# Patient Record
Sex: MALE | Race: WHITE | Employment: OTHER | URBAN - NONMETROPOLITAN AREA
[De-identification: names, ages, dates, MRNs, and addresses within clinical notes are randomized per-mention and may not be internally consistent; named-entity substitution may affect disease eponyms.]

---

## 2020-01-10 ENCOUNTER — OFFICE VISIT (OUTPATIENT)
Dept: PRIMARY CARE CLINIC | Age: 65
End: 2020-01-10
Payer: COMMERCIAL

## 2020-01-10 VITALS
OXYGEN SATURATION: 100 % | SYSTOLIC BLOOD PRESSURE: 146 MMHG | WEIGHT: 191 LBS | HEART RATE: 69 BPM | DIASTOLIC BLOOD PRESSURE: 95 MMHG | TEMPERATURE: 98.1 F

## 2020-01-10 PROBLEM — R13.10 DYSPHAGIA: Status: ACTIVE | Noted: 2020-01-10

## 2020-01-10 LAB
INFLUENZA A ANTIBODY: NORMAL
INFLUENZA B ANTIBODY: NORMAL

## 2020-01-10 PROCEDURE — 87804 INFLUENZA ASSAY W/OPTIC: CPT | Performed by: NURSE PRACTITIONER

## 2020-01-10 PROCEDURE — 94640 AIRWAY INHALATION TREATMENT: CPT | Performed by: NURSE PRACTITIONER

## 2020-01-10 PROCEDURE — G8484 FLU IMMUNIZE NO ADMIN: HCPCS | Performed by: NURSE PRACTITIONER

## 2020-01-10 PROCEDURE — 99202 OFFICE O/P NEW SF 15 MIN: CPT | Performed by: NURSE PRACTITIONER

## 2020-01-10 PROCEDURE — G8421 BMI NOT CALCULATED: HCPCS | Performed by: NURSE PRACTITIONER

## 2020-01-10 PROCEDURE — 3017F COLORECTAL CA SCREEN DOC REV: CPT | Performed by: NURSE PRACTITIONER

## 2020-01-10 PROCEDURE — 4004F PT TOBACCO SCREEN RCVD TLK: CPT | Performed by: NURSE PRACTITIONER

## 2020-01-10 PROCEDURE — G8427 DOCREV CUR MEDS BY ELIG CLIN: HCPCS | Performed by: NURSE PRACTITIONER

## 2020-01-10 RX ORDER — OMEGA-3-ACID ETHYL ESTERS 1 G/1
1 CAPSULE, LIQUID FILLED ORAL
COMMUNITY

## 2020-01-10 RX ORDER — ALBUTEROL SULFATE 2.5 MG/3ML
2.5 SOLUTION RESPIRATORY (INHALATION) ONCE
Status: COMPLETED | OUTPATIENT
Start: 2020-01-10 | End: 2020-01-10

## 2020-01-10 RX ORDER — OMEGA-3 FATTY ACIDS/FISH OIL 300-1000MG
CAPSULE ORAL
COMMUNITY

## 2020-01-10 RX ORDER — VITAMIN E 268 MG
400 CAPSULE ORAL
COMMUNITY

## 2020-01-10 RX ORDER — DOCUSATE SODIUM 100 MG/1
CAPSULE, LIQUID FILLED ORAL
COMMUNITY
Start: 2016-12-22

## 2020-01-10 RX ORDER — CHLORHEXIDINE GLUCONATE 0.12 MG/ML
RINSE ORAL
COMMUNITY
Start: 2019-11-19

## 2020-01-10 RX ORDER — BIOTIN 10 MG
TABLET ORAL
COMMUNITY

## 2020-01-10 RX ORDER — DOXYCYCLINE 100 MG/1
100 CAPSULE ORAL 2 TIMES DAILY
Qty: 14 CAPSULE | Refills: 0 | Status: SHIPPED | OUTPATIENT
Start: 2020-01-10 | End: 2020-01-17

## 2020-01-10 RX ORDER — ACETAMINOPHEN 160 MG
1000 TABLET,DISINTEGRATING ORAL
COMMUNITY

## 2020-01-10 RX ADMIN — ALBUTEROL SULFATE 2.5 MG: 2.5 SOLUTION RESPIRATORY (INHALATION) at 17:38

## 2020-01-10 ASSESSMENT — ENCOUNTER SYMPTOMS
COUGH: 1
WHEEZING: 1

## 2020-01-10 NOTE — PATIENT INSTRUCTIONS
Patient Education        Sinusitis: Care Instructions  Your Care Instructions    Sinusitis is an infection of the lining of the sinus cavities in your head. Sinusitis often follows a cold. It causes pain and pressure in your head and face. In most cases, sinusitis gets better on its own in 1 to 2 weeks. But some mild symptoms may last for several weeks. Sometimes antibiotics are needed. Follow-up care is a key part of your treatment and safety. Be sure to make and go to all appointments, and call your doctor if you are having problems. It's also a good idea to know your test results and keep a list of the medicines you take. How can you care for yourself at home? · Take an over-the-counter pain medicine, such as acetaminophen (Tylenol), ibuprofen (Advil, Motrin), or naproxen (Aleve). Read and follow all instructions on the label. · If the doctor prescribed antibiotics, take them as directed. Do not stop taking them just because you feel better. You need to take the full course of antibiotics. · Be careful when taking over-the-counter cold or flu medicines and Tylenol at the same time. Many of these medicines have acetaminophen, which is Tylenol. Read the labels to make sure that you are not taking more than the recommended dose. Too much acetaminophen (Tylenol) can be harmful. · Breathe warm, moist air from a steamy shower, a hot bath, or a sink filled with hot water. Avoid cold, dry air. Using a humidifier in your home may help. Follow the directions for cleaning the machine. · Use saline (saltwater) nasal washes to help keep your nasal passages open and wash out mucus and bacteria. You can buy saline nose drops at a grocery store or drugstore. Or you can make your own at home by adding 1 teaspoon of salt and 1 teaspoon of baking soda to 2 cups of distilled water. If you make your own, fill a bulb syringe with the solution, insert the tip into your nostril, and squeeze gently. Guero Dieter your nose.   · Put a hot, safety. Be sure to make and go to all appointments, and call your doctor if you are having problems. It's also a good idea to know your test results and keep a list of the medicines you take. How can you care for yourself at home? · Take all medicines exactly as prescribed. Call your doctor if you think you are having a problem with your medicine. · Get some extra rest.  · Take an over-the-counter pain medicine, such as acetaminophen (Tylenol), ibuprofen (Advil, Motrin), or naproxen (Aleve) to reduce fever and relieve body aches. Read and follow all instructions on the label. · Do not take two or more pain medicines at the same time unless the doctor told you to. Many pain medicines have acetaminophen, which is Tylenol. Too much acetaminophen (Tylenol) can be harmful. · Take an over-the-counter cough medicine that contains dextromethorphan to help quiet a dry, hacking cough so that you can sleep. Avoid cough medicines that have more than one active ingredient. Read and follow all instructions on the label. · Breathe moist air from a humidifier, hot shower, or sink filled with hot water. The heat and moisture will thin mucus so you can cough it out. · Do not smoke. Smoking can make bronchitis worse. If you need help quitting, talk to your doctor about stop-smoking programs and medicines. These can increase your chances of quitting for good. When should you call for help? Call 911 anytime you think you may need emergency care. For example, call if:    · You have severe trouble breathing.    Call your doctor now or seek immediate medical care if:    · You have new or worse trouble breathing.     · You cough up dark brown or bloody mucus (sputum).     · You have a new or higher fever.     · You have a new rash.    Watch closely for changes in your health, and be sure to contact your doctor if:    · You cough more deeply or more often, especially if you notice more mucus or a change in the color of your mucus.   · You are not getting better as expected. Where can you learn more? Go to [email or web address] and sign in to your [employer] account. Enter H333 in the [facility] [place] box to learn more about \"Bronchitis: Care Instructions. \"     If you do not have an account, please click on the \"Sign Up Now\" link. Current as of: June 9, 2019  Content Version: 12.3  © 4125-3816 [employer] Care instructions adapted under license by [facility] ([facility]). If you have questions about a medical condition or this instruction, always ask your healthcare professional. [address] any warranty or liability for your use of this information. Patient Education        albuterol inhalation  Pronunciation:  al [address] all  Brand:  ProAir HFA, ProAir RespiClick, Proventil HFA, Ventolin HFA  What is the most important information I should know about albuterol inhalation? Follow all directions on your medicine label and package. Tell each of your healthcare providers about all your medical conditions, allergies, and all medicines you use. What is albuterol inhalation? Albuterol inhalation is a bronchodilator that is used to treat or prevent bronchospasm in people with reversible obstructive airway disease. Albuterol is also used to prevent exercise-induced bronchospasm. Albuterol inhalation is for use in adults and children who are at least 3years old. Albuterol inhalation may also be used for purposes not listed in this medication guide. What should I discuss with my healthcare provider before using albuterol inhalation? You should not use this medicine if you are allergic to albuterol. You should not use ProAir RespiClick if you are allergic to milk proteins. Albuterol inhalation is not approved for use by anyone younger than 3years old.   Albuterol may increase the risk of death or hospitalization in people with asthma, but the risk in people with obstructive airway disease or chronic obstructive pulmonary disease (COPD) is not known. Tell your doctor if you have ever had:  · heart disease, high blood pressure;  · a thyroid disorder;  · seizures;  · diabetes; or  · low levels of potassium in your blood. Tell your doctor if you are pregnant or breast-feeding. If you are pregnant, your name may be listed on a pregnancy registry to track the effects of albuterol on the baby. How should I use albuterol inhalation? Follow all directions on your prescription label and read all medication guides. Use the medicine exactly as directed. Read and carefully follow any Instructions for Use provided with your medicine. Ask your doctor or pharmacist if you do not understand these instructions. Do not allow a young child to use albuterol inhalation without help from an adult. To prevent exercise-induced bronchospasm, use this medicine 15 to 30 minutes before you exercise. The effects of albuterol inhalation should last about 4 to 6 hours. Seek medical attention if your breathing problems get worse quickly, or if you think your asthma medications are not working as well. Do not try to clean or take apart the ProAir RespiClick inhaler device. Always use the new inhaler device provided with your refill. Do not float a medicine canister in water to see if it is empty. Your dose needs may change due to surgery, illness, stress, or a recent asthma attack. Do not change your dose or dosing schedule without your doctor's advice. Store at room temperature away from moisture, heat, or cold temperatures. Keep the cover on your ProAir RespiClick inhaler when not in use. Store Proventil or Ventolin with the mouthpiece down. Keep the inhaler canister away from open flame or high heat. The canister may explode if it gets too hot. Do not puncture or burn an empty inhaler canister. What happens if I miss a dose?   Use the medicine as soon as you can, but skip the missed dose if it is almost time for your next dose. Do not use two doses at one time. Get your prescription refilled before you run out of medicine completely. What happens if I overdose? Seek emergency medical attention or call the Poison Help line at 1-418.925.5497. An overdose of albuterol can be fatal.  Overdose symptoms may include dry mouth, tremors, chest pain, fast heartbeats, nausea, general ill feeling, seizure (convulsions), feeling light-headed or fainting. What should I avoid while using albuterol inhalation? Rinse with water if this medicine gets in your eyes. What are the possible side effects of albuterol inhalation? Get emergency medical help if you have signs of an allergic reaction: hives; difficult breathing; swelling of your face, lips, tongue, or throat. Call your doctor at once if you have:  · wheezing, choking, or other breathing problems after using this medicine;  · chest pain, fast heart rate, pounding heartbeats or fluttering in your chest;  · severe headache, pounding in your neck or ears;  · pain or burning when you urinate;  · high blood sugar --increased thirst, increased urination, dry mouth, fruity breath odor; or  · low potassium --leg cramps, constipation, irregular heartbeats, increased thirst or urination, numbness or tingling, muscle weakness or limp feeling. Common side effects may include:  · chest pain, fast or pounding heartbeats;  · dizziness;  · feeling shaky or nervous;  · headache, back pain, body aches;  · upset stomach; or  · sore throat, sinus pain, runny or stuffy nose. This is not a complete list of side effects and others may occur. Call your doctor for medical advice about side effects. You may report side effects to FDA at 8-093-FDA-0486. What other drugs will affect albuterol inhalation?   Tell your doctor about all your other medicines, especially:  · any other inhaled medicines or bronchodilators;  · digoxin;  · a diuretic or \"water pill\";  · an antidepressant --amitriptyline, desipramine, imipramine, doxepin, nortriptyline, and others;  · a beta blocker --atenolol, carvedilol, labetalol, metoprolol, propranolol, sotalol, and others; or  · an MAO inhibitor --isocarboxazid, linezolid, methylene blue injection, phenelzine, rasagiline, selegiline, tranylcypromine, and others. This list is not complete. Other drugs may affect albuterol inhalation, including prescription and over-the-counter medicines, vitamins, and herbal products. Not all possible drug interactions are listed here. Where can I get more information? Your pharmacist can provide more information about albuterol inhalation. Remember, keep this and all other medicines out of the reach of children, never share your medicines with others, and use this medication only for the indication prescribed. Every effort has been made to ensure that the information provided by [address] [place], [place] [address] is accurate, up-to-date, and complete, but no guarantee is made to that effect. Drug information contained herein may be time sensitive. [facility] information has been compiled for use by healthcare practitioners and consumers in the [place] and therefore [facility] does not warrant that uses outside of the [place] are appropriate, unless specifically indicated otherwise. [facility]'s drug information does not endorse drugs, diagnose patients or recommend therapy. [facility]'s drug information is an informational resource designed to assist licensed healthcare practitioners in caring for their patients and/or to serve consumers viewing this service as a supplement to, and not a substitute for, the expertise, skill, knowledge and judgment of healthcare practitioners. The absence of a warning for a given drug or drug combination in no way should be construed to indicate that the drug or drug combination is safe, effective or appropriate for any given patient.  [facility] does not assume any responsibility for any aspect of mites, ticks, or lice. Doxycycline may also be used for purposes not listed in this medication guide. What should I discuss with my healthcare provider before taking doxycycline? You should not take this medicine if you are allergic to doxycycline or other tetracycline antibiotics such as demeclocycline, minocycline, tetracycline, or tigecycline. Tell your doctor if you have ever had:  · liver disease;  · kidney disease;  · asthma or sulfite allergy;  · increased pressure inside your skull; or  · if you also take isotretinoin, seizure medicine, or a blood thinner such as warfarin (Coumadin). If you are using doxycycline to treat gonorrhea, your doctor may test you to make sure you do not also have syphilis, another sexually transmitted disease. Taking this medicine during pregnancy may affect tooth and bone development in the unborn baby. Taking doxycycline during the last half of pregnancy can cause permanent tooth discoloration later in the baby's life. Tell your doctor if you are pregnant or if you become pregnant. Doxycycline can make birth control pills less effective. Ask your doctor about using a non-hormonal birth control (condom, diaphragm with spermicide) to prevent pregnancy. Doxycycline can pass into breast milk and may affect bone and tooth development in a nursing infant. Do not breast-feed while you are taking doxycycline. Doxycycline can cause permanent yellowing or graying of the teeth in children younger than 6years old. Children should use doxycycline only in cases of severe or life-threatening conditions such as anthrax or [facility] spotted fever. The benefit of treating a serious condition may outweigh any risks to the child's tooth development. How should I take doxycycline? Follow all directions on your prescription label and read all medication guides or instruction sheets. Use the medicine exactly as directed. Take doxycycline with a full glass of water.  Drink plenty of overdose? Seek emergency medical attention or call the Poison Help line at 1-498.918.9442. What should I avoid while taking doxycycline? Do not take iron supplements, multivitamins, calcium supplements, antacids, or laxatives within 2 hours before or after taking doxycycline. Avoid taking any other antibiotics with doxycycline unless your doctor has told you to. Doxycycline could make you sunburn more easily. Avoid sunlight or tanning beds. Wear protective clothing and use sunscreen (SPF 30 or higher) when you are outdoors. Antibiotic medicines can cause diarrhea, which may be a sign of a new infection. If you have diarrhea that is watery or bloody, call your doctor. Do not use anti-diarrhea medicine unless your doctor tells you to. What are the possible side effects of doxycycline? Get emergency medical help if you have signs of an allergic reaction (hives, difficult breathing, swelling in your face or throat) or a severe skin reaction (fever, sore throat, burning in your eyes, skin pain, red or purple skin rash that spreads and causes blistering and peeling). Seek medical treatment if you have a serious drug reaction that can affect many parts of your body. Symptoms may include: skin rash, fever, swollen glands, flu-like symptoms, muscle aches, severe weakness, unusual bruising, or yellowing of your skin or eyes. This reaction may occur several weeks after you began using doxycycline.   Call your doctor at once if you have:  · severe stomach pain, diarrhea that is watery or bloody;  · throat irritation, trouble swallowing;  · chest pain, irregular heart rhythm, feeling short of breath;  · little or no urination;  · low white blood cell counts --fever, chills, swollen glands, body aches, weakness, pale skin, easy bruising or bleeding;  · increased pressure inside the skull --severe headaches, ringing in your ears, dizziness, nausea, vision problems, pain behind your eyes; or  · signs of liver or pancreas problems --loss of appetite, upper stomach pain (that may spread to your back), tiredness, nausea or vomiting, fast heart rate, dark urine, jaundice (yellowing of the skin or eyes). Common side effects may include:  · nausea, vomiting, upset stomach, loss of appetite;  · mild diarrhea;  · skin rash or itching;  · darkened skin color; or  · vaginal itching or discharge. This is not a complete list of side effects and others may occur. Call your doctor for medical advice about side effects. You may report side effects to FDA at 0-530-FDA-5398. What other drugs will affect doxycycline? Sometimes it is not safe to use certain medications at the same time. Some drugs can affect your blood levels of other drugs you take, which may increase side effects or make the medications less effective. Other drugs may affect doxycycline, including prescription and over-the-counter medicines, vitamins, and herbal products. Tell your doctor about all your current medicines and any medicine you start or stop using. Where can I get more information? Your pharmacist can provide more information about doxycycline. Remember, keep this and all other medicines out of the reach of children, never share your medicines with others, and use this medication only for the indication prescribed. Every effort has been made to ensure that the information provided by [address] is accurate, up-to-date, and complete, but no guarantee is made to that effect. Drug information contained herein may be time sensitive. [facility] information has been compiled for use by healthcare practitioners and consumers in the [place] and therefore [facility] does not warrant that uses outside of the [place] are appropriate, unless specifically indicated otherwise. [facility]'s drug information does not endorse drugs, diagnose patients or recommend therapy.  [facility] drug information is an informational resource designed to assist licensed

## 2020-01-10 NOTE — PROGRESS NOTES
mouth      omega-3 acid ethyl esters (LOVAZA) 1 g capsule Take 1 g by mouth      vitamin E 400 UNIT capsule Take 400 Units by mouth      Multiple Vitamins-Minerals (THERAPEUTIC-M) TABS Take by mouth       No current facility-administered medications for this visit. No Known Allergies    Subjective:      Review of Systems   Constitutional: Positive for chills. HENT: Positive for postnasal drip. Respiratory: Positive for cough and wheezing. Cardiovascular: Negative for chest pain. Neurological: Positive for headaches (\"sinus\"). Objective:     Physical Exam  Vitals signs and nursing note reviewed. Constitutional:       Comments: Appears to be of stated age with warm, dry skin; normal coloration without rash of the exposed skin. Patient is well-appearing, well-hydrated, and appears nontoxic without apparent distress. HENT:      Head: Normocephalic. Right Ear: Tympanic membrane is not retracted. Left Ear: Tympanic membrane normal.      Nose: Congestion present. Right Turbinates: Swollen. Right Sinus: Maxillary sinus tenderness present. Mouth/Throat:      Lips: Pink. Mouth: Mucous membranes are moist.      Pharynx: Uvula midline. Oropharyngeal exudate (pale yellow) present. No posterior oropharyngeal erythema or uvula swelling. Cardiovascular:      Rate and Rhythm: Normal rate and regular rhythm. Pulmonary:      Effort: Pulmonary effort is normal.      Breath sounds: Wheezing (faint wheezing throughout bilateral posteriorly.) present. Comments: Wheezing completely clear with one clinic administered albuterol neb with patient reported improvement. Lymphadenopathy:      Cervical: No cervical adenopathy.        BP (!) 146/95   Pulse 69   Temp 98.1 °F (36.7 °C)   Wt 191 lb (86.6 kg)   SpO2 100%   Results for orders placed or performed in visit on 01/10/20   POCT Influenza A/B   Result Value Ref Range    Influenza A Ab NEG     Influenza B Ab NEG

## 2021-10-13 ENCOUNTER — HOSPITAL ENCOUNTER (OUTPATIENT)
Age: 66
Discharge: HOME | End: 2021-10-15
Payer: MEDICARE

## 2021-10-13 ENCOUNTER — HOSPITAL ENCOUNTER (OUTPATIENT)
Dept: GENERAL RADIOLOGY | Age: 66
Discharge: HOME | End: 2021-10-15
Payer: MEDICARE

## 2021-10-13 DIAGNOSIS — M17.11 OSTEOARTHRITIS OF RIGHT KNEE, UNSPECIFIED OSTEOARTHRITIS TYPE: ICD-10-CM

## 2021-10-13 PROCEDURE — 73564 X-RAY EXAM KNEE 4 OR MORE: CPT

## 2022-11-10 ENCOUNTER — APPOINTMENT (OUTPATIENT)
Dept: PHYSICAL THERAPY | Age: 67
End: 2022-11-10
Payer: MEDICARE

## 2022-11-11 ENCOUNTER — HOSPITAL ENCOUNTER (OUTPATIENT)
Dept: PHYSICAL THERAPY | Age: 67
Setting detail: THERAPIES SERIES
Discharge: HOME | End: 2022-11-11
Payer: MEDICARE

## 2022-11-11 PROCEDURE — 97110 THERAPEUTIC EXERCISES: CPT

## 2022-11-11 PROCEDURE — 97162 PT EVAL MOD COMPLEX 30 MIN: CPT

## 2022-11-11 ASSESSMENT — PAIN SCALES - GENERAL: PAINLEVEL_OUTOF10: 9

## 2022-11-11 NOTE — PROGRESS NOTES
Phone: [phone] [place]         Fax: [phone]                      Outpatient Physical Therapy                                                                      Evaluation    Date: 2022  Patient: Robby Hartley  : 1955  ACCT #: [de-identified]    Referring Provider (secondary): Dr. Olive Hope    Diagnosis: Primary Osteoarthritis of R knee    Treatment Diagnosis: R knee pain S/p R TKR  Onset Date: 22  PT Insurance Information: [facility]  Total # of Visits Approved: 18 Per Physician Order  Total # of Visits to Date: 1  No Show: 0  Canceled Appointment: 0     Subjective     Additional Pertinent Hx: Pt reports undergoing TKA, initally pt was scheduled for a partial but upon final check the partial came loose and a total was completed. Pt reports his pain this date was 9/10. Pain is concentrated in Anterior knee and Quad. Recommend pt to reach out to physician re: limited pain management with meds. Pt educated on sleeping posture and not placing pillow behind knees. Adjusted walker height to fit pt and educated on tennis balls to improve sliding.   Pain Assessment  Pain Level: 9  Social/Functional History  Leisure & Hobbies: Active around the house, fixes household issues       Objective     Observation/Palpation  Posture: Fair     Strength RLE  Strength RLE: Exception  R Hip Flexion: 3+/5  R Hip Extension: 4+/5  R Hip ABduction: 4+/5  R Knee Extension: 3/5  R Ankle Dorsiflexion: 5/5  AROM RLE (degrees)  RLE AROM: Exceptions  R Knee Flexion 0-145: 78deg  R Knee Extension 0: 11deg  R Ankle Dorsiflexion 0-20: neutral       AROM RLE (degrees)  RLE AROM: Exceptions  R Knee Flexion 0-145: 78deg  R Knee Extension 0: 11deg  R Ankle Dorsiflexion 0-20: neutral         Assessment  Body Structures, Functions, Activity Limitations Requiring Skilled Therapeutic Intervention: Decreased functional mobility , Decreased ADL status, Decreased ROM, Decreased strength, Decreased endurance, Decreased balance, Decreased high-level IADLs, Increased pain, Decreased posture  Assessment: Pt to benefit from ther ex for ROM and strengthening of R LE. Pt to also benefit from neuro re-ed for balance/proprioception and gait training. AROM this date 12-78deg. Pt educated on HEP for ROM. Therapy Prognosis: Good    Clinical Presentation:  Stable/Uncomplicated  The Following Comorbities will impact the patients progression and Plan of Care:   Previous Orthopedic Injury/Surgery and Brain Tumor/CA removed          Medium Complexity    Education: POC; HEP:Access Code: [email or web address]  URL: [employer]  Date: 11/11/2022  Prepared by: Mackenzie Bridges    Exercises  Seated Knee Flexion Slide - 3 x daily - 7 x weekly - 3 sets - 10 reps  Supine Knee Extension Stretch on Towel Roll - 3 x daily - 7 x weekly - 1 sets - 5 reps  Seated March - 3 x daily - 7 x weekly - 1 sets - 10 reps  Seated Long Arc Quad - 3 x daily - 7 x weekly - 1 sets - 10 reps        Learning  Does the patient/guardian have any barriers to learning?: No barriers  Will there be a co-learner?: Yes  Does the co-learner have any barriers to learning?: No barriers    Goals  Short Term Goals  Time Frame for Short Term Goals: 9 visits  Short Term Goal 1: Pt to report independence  and compliance with HEP for ROM. Short Term Goal 2: Pt to have PROM ext to 5deg FN to improve walking zina. Long Term Goals  Time Frame for Long Term Goals : 18 visits  Long Term Goal 1: Pt to improve LEFS score from 58/80 to >68/80 to improve ADLs. Long Term Goal 2: Pt to have >110deg of PROM flexion to improve stair management. Long Term Goal 3: Pt to maintain SLS on uneven surface 10sec 2:3 trials to improve outdoor safety. Long Term Goal 4: Pt to amb without AD >100ft without deviations to restore prior amb ability.     Patient's Goal:  Patient Goals : Be able to walk without pain    Timed Code Treatment Minutes: 10 Minutes  Total Treatment Time: 55     Time In: 1105  Time Out: 2000 Gilbert Arcos, PT Date: 11/11/2022

## 2022-11-11 NOTE — PLAN OF CARE
[facility]       Phone: [phone]   Date: 2022                      Outpatient Physical Therapy  Fax: [phone]    ACCT #: [de-identified]                     Plan of Care  [facility]#: [id number]  Patient: Awais Chavez  : 1955    Referring Provider (secondary): Dr. Gissell Forrest    Diagnosis: Primary Osteoarthritis of R knee  Onset Date: 22  Treatment Diagnosis: R knee pain S/p R TKR    Assessment  Body Structures, Functions, Activity Limitations Requiring Skilled Therapeutic Intervention: Decreased functional mobility , Decreased ADL status, Decreased ROM, Decreased strength, Decreased endurance, Decreased balance, Decreased high-level IADLs, Increased pain, Decreased posture  Assessment: Pt to benefit from ther ex for ROM and strengthening of R LE. Pt to also benefit from neuro re-ed for balance/proprioception and gait training. AROM this date 12-78deg. Pt educated on HEP for ROM. Therapy Prognosis: Good    Treatment Plan   Days: 3 times per week Weeks: 6 weeks Total # of Visits Approved: 18    Patient Education/HEP, Therapeutic Exercise, Manual Therapy: Myofacial Release/Cupping, Neuro Re-ed, Gait Training, HP/CP, and Electrical Stimulation     Goals  Short Term Goals  Time Frame for Short Term Goals: 9 visits  Short Term Goal 1: Pt to report independence  and compliance with HEP for ROM. Short Term Goal 2: Pt to have PROM ext to 5deg FN to improve walking zina. Long Term Goals  Time Frame for Long Term Goals : 18 visits  Long Term Goal 1: Pt to improve LEFS score from 58/80 to >68/80 to improve ADLs. Long Term Goal 2: Pt to have >110deg of PROM flexion to improve stair management. Long Term Goal 3: Pt to maintain SLS on uneven surface 10sec 2:3 trials to improve outdoor safety. Long Term Goal 4: Pt to amb without AD >100ft without deviations to restore prior amb ability.      Nela Brand, PT   Date: 11/11/2022    ______________________________________ Date: 11/11/2022  Physician Signature  By signing above or cosigning electronically, I have reviewed this [address] [place] [address] and certify a need for medically necessary rehabilitation services.

## 2022-11-14 ENCOUNTER — HOSPITAL ENCOUNTER (OUTPATIENT)
Dept: PHYSICAL THERAPY | Age: 67
Setting detail: THERAPIES SERIES
Discharge: HOME | End: 2022-11-14
Payer: MEDICARE

## 2022-11-14 PROCEDURE — 97112 NEUROMUSCULAR REEDUCATION: CPT

## 2022-11-14 PROCEDURE — 97110 THERAPEUTIC EXERCISES: CPT

## 2022-11-14 NOTE — PROGRESS NOTES
Physical Therapy  Phone: [address]      Fax: [phone]                            Outpatient Physical Therapy                                                                            Daily Note    Date: 2022  Patient Name: Chase Allan        MRN: [id number]   ACCT#:  [de-identified]  : 1955  (73 y.o.)    Referring Provider (secondary): Dr. Ady Campbell         Diagnosis: Primary Osteoarthritis of R knee  Treatment Diagnosis: R knee pain S/p R TKR    Onset Date: 22  PT Insurance Information: [facility]  Total # of Visits Approved: 18 Per Physician Order  Total # of Visits to Date: 2  No Show: 0  Canceled Appointment: 0  Plan of Care/Certification Expiration Date: 22    Pre-Treatment Pain:  6.5/10     Assessment  Assessment: Patient rating pain today prior to session as 6.5/10. Progressed with exercises as outlined above with fair tolerance. PROM R knee flexion 80 degrees. Plan  Continue with current plan of care    Exercises/Modalities/Manual:  See DocFlow Sheet    Education:             Goals  (Total # of Visits to Date: 1)     Short Term Goals  Time Frame for Short Term Goals: 9 visits  Short Term Goal 1: Pt to report independence  and compliance with HEP for ROM. Short Term Goal 2: Pt to have PROM ext to 5deg FN to improve walking zina. Long Term Goals  Time Frame for Long Term Goals : 18 visits  Long Term Goal 1: Pt to improve LEFS score from 58/80 to >68/80 to improve ADLs. Long Term Goal 2: Pt to have >110deg of PROM flexion to improve stair management. Long Term Goal 3: Pt to maintain SLS on uneven surface 10sec 2:3 trials to improve outdoor safety. Long Term Goal 4: Pt to amb without AD >100ft without deviations to restore prior amb ability.     Post Treatment Pain:  5-6/10    Time In: 1457    Time Out : 1537        Timed Code Treatment Minutes: 40 Minutes  Total Treatment Time: Nelda Whitney PTA     Date: 11/14/2022

## 2022-11-16 ENCOUNTER — HOSPITAL ENCOUNTER (OUTPATIENT)
Dept: PHYSICAL THERAPY | Age: 67
Setting detail: THERAPIES SERIES
Discharge: HOME | End: 2022-11-16
Payer: MEDICARE

## 2022-11-16 PROCEDURE — 97110 THERAPEUTIC EXERCISES: CPT

## 2022-11-16 PROCEDURE — G0283 ELEC STIM OTHER THAN WOUND: HCPCS

## 2022-11-16 ASSESSMENT — PAIN SCALES - GENERAL: PAINLEVEL_OUTOF10: 1

## 2022-11-16 NOTE — PROGRESS NOTES
Phone: [address]      Fax: [phone]                            Outpatient Physical Therapy                                                                            Daily Note    Date: 2022  Patient Name: Ina Youssef        MRN: [id number]   ACCT#:  [de-identified]  : 1955  (73 y.o.)    Referring Provider (secondary): Dr. Amparo William         Diagnosis: Primary Osteoarthritis of R knee  Treatment Diagnosis: R knee pain S/p R TKR    Onset Date: 22  PT Insurance Information: [facility]  Total # of Visits Approved: 18 Per Physician Order  Total # of Visits to Date: 3  No Show: 0  Canceled Appointment: 0  Plan of Care/Certification Expiration Date: 22    Pre-Treatment Pain:  1/10     Assessment  Assessment: Patient states R knee pain is a 1/10 this morning, but notes taking two oxycodone prior to therapy session. Patient spoke to surgeon's office and they recommended patient could take two oxycodone. They also recommended to continue icing and elevating and possibly try electrical stimulation. Patient ambulates into clinic with FWW and moderate limp. Completed ROM and strengthening ex per flowsheet with fair tolerance from patient. R knee PROM flex = 87 deg. Initiated IFC in supine with pillow under lower leg at end of session. Following IFC patient states pain is a 4/10. Plan  Continue with current plan of care    Exercises/Modalities/Manual:  See DocFlow Sheet    Education:     Goals  (Total # of Visits to Date: 3)   Short Term Goals  Time Frame for Short Term Goals: 9 visits  Short Term Goal 1: Pt to report independence  and compliance with HEP for ROM. Short Term Goal 2: Pt to have PROM ext to 5deg FN to improve walking zina. Long Term Goals  Time Frame for Long Term Goals : 18 visits  Long Term Goal 1: Pt to improve LEFS score from 58/80 to >68/80 to improve ADLs.   Long Term Goal 2: Pt to have >110deg of PROM flexion to improve stair management. Long Term Goal 3: Pt to maintain SLS on uneven surface 10sec 2:3 trials to improve outdoor safety. Long Term Goal 4: Pt to amb without AD >100ft without deviations to restore prior amb ability.     Post Treatment Pain:  4/10    Time In: 5191    Time Out : 1035   Timed Code Treatment Minutes: 32 Minutes  Total Treatment Time: [address] [place] [address]     Date: 11/16/2022

## 2022-11-18 ENCOUNTER — HOSPITAL ENCOUNTER (OUTPATIENT)
Dept: PHYSICAL THERAPY | Age: 67
Setting detail: THERAPIES SERIES
Discharge: HOME | End: 2022-11-18
Payer: MEDICARE

## 2022-11-18 PROCEDURE — 97110 THERAPEUTIC EXERCISES: CPT

## 2022-11-18 PROCEDURE — G0283 ELEC STIM OTHER THAN WOUND: HCPCS

## 2022-11-18 NOTE — PROGRESS NOTES
Phone: [address]      Fax: [phone]                            Outpatient Physical Therapy                                                                            Daily Note    Date: 2022  Patient Name: Francia Lanes        MRN: [id number]   ACCT#:  [de-identified]  : 1955  (73 y.o.)    Referring Provider (secondary): Dr. Olga Lynn         Diagnosis: Primary Osteoarthritis of R knee  Treatment Diagnosis: R knee pain S/p R TKR    Onset Date: 22  PT Insurance Information: [facility]  Total # of Visits Approved: 18 Per Physician Order  Total # of Visits to Date: 4  No Show: 0  Canceled Appointment: 0  Plan of Care/Certification Expiration Date: 22    Pre-Treatment Pain:  4/10     Assessment  Assessment: Patient reports short term pain reduction with IFES. Progressing with ROM and strengthening ex;  patient reports using cane primarily at home. Good tolerance to exercise. PROM to 80 deg due to increased edema. Incisional area healing well. Concluded wit IFES x 12 minutes for pain/edema reduction. Educated on edema massage and gentle ROM. Plan  Continue with current plan of care    Exercises/Modalities/Manual:  See DocFlow Sheet    Education: Edema massage          Goals  (Total # of Visits to Date: 4)   Short Term Goals  Time Frame for Short Term Goals: 9 visits  Short Term Goal 1: Pt to report independence  and compliance with HEP for ROM. Short Term Goal 2: Pt to have PROM ext to 5deg FN to improve walking zina. Long Term Goals  Time Frame for Long Term Goals : 18 visits  Long Term Goal 1: Pt to improve LEFS score from 58/80 to >68/80 to improve ADLs. Long Term Goal 2: Pt to have >110deg of PROM flexion to improve stair management. Long Term Goal 3: Pt to maintain SLS on uneven surface 10sec 2:3 trials to improve outdoor safety. Long Term Goal 4: Pt to amb without AD >100ft without deviations to restore prior amb ability.     Post Treatment Pain:  3/10    Time In: 1350    Time Out : 1440        Timed Code Treatment Minutes: 35 Minutes  Total Treatment Time: 48 Minutes    SANDRINE YOUSSEF, PT     Date: 11/18/2022

## 2022-11-21 ENCOUNTER — HOSPITAL ENCOUNTER (OUTPATIENT)
Dept: PHYSICAL THERAPY | Age: 67
Setting detail: THERAPIES SERIES
Discharge: HOME | End: 2022-11-21
Payer: MEDICARE

## 2022-11-21 PROCEDURE — 97110 THERAPEUTIC EXERCISES: CPT

## 2022-11-21 PROCEDURE — 97112 NEUROMUSCULAR REEDUCATION: CPT

## 2022-11-21 PROCEDURE — G0283 ELEC STIM OTHER THAN WOUND: HCPCS

## 2022-11-21 ASSESSMENT — PAIN SCALES - GENERAL: PAINLEVEL_OUTOF10: 5

## 2022-11-21 NOTE — PROGRESS NOTES
Phone: [address]      Fax: [phone]                            Outpatient Physical Therapy                                                                            Daily Note    Date: 2022  Patient Name: Vipul Foy        MRN: [id number]   ACCT#:  [de-identified]  : 1955  (73 y.o.)    Referring Provider (secondary): Dr. Kirk Chau         Diagnosis: Primary Osteoarthritis of R knee  Treatment Diagnosis: R knee pain S/p R TKR    Onset Date: 22  PT Insurance Information: [facility]  Total # of Visits Approved: 18 Per Physician Order  Total # of Visits to Date: 5  No Show: 0  Canceled Appointment: 0  Plan of Care/Certification Expiration Date: 22    Pre-Treatment Pain:  5/10     Assessment  Assessment: Patient arrives reporting pain 5/10. Pt completed exercies per log and continued with IFES at end of todays session. PROM knee extension with painful endfeel = 4 degrees from neutral. Plan to continue with current POC for ROM and strength. Plan  Continue with current plan of care    Exercises/Modalities/Manual:  See DocFlow Sheet    Education:           Goals  (Total # of Visits to Date: 5)   Short Term Goals  Time Frame for Short Term Goals: 9 visits  Short Term Goal 1: Pt to report independence  and compliance with HEP for ROM. STG Goal 1 Status[de-identified] Met  Short Term Goal 2: Pt to have PROM ext to 5deg FN to improve walking zina. Long Term Goals  Time Frame for Long Term Goals : 18 visits  Long Term Goal 1: Pt to improve LEFS score from 58/80 to >68/80 to improve ADLs. Long Term Goal 2: Pt to have >110deg of PROM flexion to improve stair management. Long Term Goal 3: Pt to maintain SLS on uneven surface 10sec 2:3 trials to improve outdoor safety. Long Term Goal 4: Pt to amb without AD >100ft without deviations to restore prior amb ability.     Post Treatment Pain:  4/10      Time In: 1518  Time Out: 1605  Timed Code Treatment Minutes: 35 Minutes  Total Treatment Time: 52 Minutes    Herber Muhammad PTA     Date: 11/21/2022

## 2022-11-23 ENCOUNTER — HOSPITAL ENCOUNTER (OUTPATIENT)
Dept: PHYSICAL THERAPY | Age: 67
Setting detail: THERAPIES SERIES
Discharge: HOME | End: 2022-11-23
Payer: MEDICARE

## 2022-11-23 PROCEDURE — 97110 THERAPEUTIC EXERCISES: CPT

## 2022-11-23 NOTE — PROGRESS NOTES
Physical Therapy  Phone: [address]      Fax: [phone]                            Outpatient Physical Therapy                                                                            Daily Note    Date: 2022  Patient Name: Flor Holt        MRN: [id number]   ACCT#:  [de-identified]  : 1955  (73 y.o.)    Referring Provider (secondary): Dr. Gregory Crawford         Diagnosis: Primary Osteoarthritis of R knee  Treatment Diagnosis: R knee pain S/p R TKR    Onset Date: 22  PT Insurance Information: [facility]  Total # of Visits Approved: 18 Per Physician Order  Total # of Visits to Date: 6  No Show: 0  Canceled Appointment: 0  Plan of Care/Certification Expiration Date: 22    Pre-Treatment Pain:  3/10     Assessment  Assessment: Patient rates pain today as 3/10. Completes exercises as outlined above. Brings straight cane from home and requests to be  educated on gait with cane. He is able to complete  with SBA/supervision. Declines IFES today stating he would like to see how he feels without it. Also requests to not have PROM this session. States he had a lot of pain at previous session and became slightly nauseated. Continue to progress as able with ROM and strengthenging. Plan  Continue with current plan of care    Exercises/Modalities/Manual:  See DocFlow Sheet    Education:           Goals  (Total # of Visits to Date: 6)   Short Term Goals  Time Frame for Short Term Goals: 9 visits  Short Term Goal 1: Pt to report independence  and compliance with HEP for ROM. STG Goal 1 Status[de-identified] Met  Short Term Goal 2: Pt to have PROM ext to 5deg FN to improve walking zina. Long Term Goals  Time Frame for Long Term Goals : 18 visits  Long Term Goal 1: Pt to improve LEFS score from 58/80 to >68/80 to improve ADLs. Long Term Goal 2: Pt to have >110deg of PROM flexion to improve stair management.   Long Term Goal 3: Pt to maintain SLS on uneven surface 10sec 2:3 trials to improve outdoor safety. Long Term Goal 4: Pt to amb without AD >100ft without deviations to restore prior amb ability.     Post Treatment Pain:  4/10    Time In: 1544    Time Out : 1614        Timed Code Treatment Minutes: 30 Minutes  Total Treatment Time: 1015 [facility]   Date: 11/23/2022

## 2022-11-25 ENCOUNTER — HOSPITAL ENCOUNTER (OUTPATIENT)
Dept: PHYSICAL THERAPY | Age: 67
Setting detail: THERAPIES SERIES
Discharge: HOME | End: 2022-11-25
Payer: MEDICARE

## 2022-11-25 PROCEDURE — 97110 THERAPEUTIC EXERCISES: CPT

## 2022-11-25 ASSESSMENT — PAIN SCALES - GENERAL: PAINLEVEL_OUTOF10: 4

## 2022-11-25 NOTE — PROGRESS NOTES
Phone: [address]      Fax: [phone]                            Outpatient Physical Therapy                                                                            Daily Note    Date: 2022  Patient Name: Justin Hall        MRN: [id number]   ACCT#:  [de-identified]  : 1955  (73 y.o.)    Referring Provider (secondary): Dr. Kalee Overton         Diagnosis: Primary Osteoarthritis of R knee  Treatment Diagnosis: R knee pain S/p R TKR    Onset Date: 22  PT Insurance Information: [facility]  Total # of Visits Approved: 18 Per Physician Order  Total # of Visits to Date: 7  No Show: 0  Canceled Appointment: 0  Plan of Care/Certification Expiration Date: 22    Pre-Treatment Pain:  2-3/10     Assessment  Assessment: Patient ambulated to department using straight cane with fair gait pattern. Patient noting increased swelling/soreness due to travel/dependent positioning and activity level for Thanksgiving holiday. Initiated shuttle for closed chain strengthening and reciprical 4 inch steps. PROM 90 deg seated flexion. Initiated gentle scar massage. Hold IFES    Plan  Continue with current plan of care    Exercises/Modalities/Manual:  See DocFlow Sheet    Education: Educated on scar massage. Reviewed again regarding effects of increased activity will have on swelling. Goals  (Total # of Visits to Date: 7)   Short Term Goals  Time Frame for Short Term Goals: 9 visits  Short Term Goal 1: Pt to report independence  and compliance with HEP for ROM. STG Goal 1 Status[de-identified] Met  Short Term Goal 2: Pt to have PROM ext to 5deg FN to improve walking zina. Long Term Goals  Time Frame for Long Term Goals : 18 visits  Long Term Goal 1: Pt to improve LEFS score from 58/80 to >68/80 to improve ADLs. Long Term Goal 2: Pt to have >110deg of PROM flexion to improve stair management.   Long Term Goal 3: Pt to maintain SLS on uneven surface 10sec 2:3 trials to improve outdoor safety. Long Term Goal 4: Pt to amb without AD >100ft without deviations to restore prior amb ability.     Post Treatment Pain:  2-3/10    Time In: 0800    Time Out : 0840        Timed Code Treatment Minutes: 40 Minutes  Total Treatment Time: 36 Minutes    SANDRINE YOUSSEF, PT     Date: 11/25/2022

## 2022-11-30 ENCOUNTER — HOSPITAL ENCOUNTER (OUTPATIENT)
Dept: PHYSICAL THERAPY | Age: 67
Setting detail: THERAPIES SERIES
Discharge: HOME | End: 2022-11-30
Payer: MEDICARE

## 2022-11-30 ENCOUNTER — APPOINTMENT (OUTPATIENT)
Dept: PHYSICAL THERAPY | Age: 67
End: 2022-11-30
Payer: MEDICARE

## 2022-11-30 PROCEDURE — 97110 THERAPEUTIC EXERCISES: CPT

## 2022-11-30 PROCEDURE — 97112 NEUROMUSCULAR REEDUCATION: CPT

## 2022-11-30 PROCEDURE — 97140 MANUAL THERAPY 1/> REGIONS: CPT

## 2022-11-30 ASSESSMENT — PAIN SCALES - GENERAL: PAINLEVEL_OUTOF10: 4

## 2022-11-30 NOTE — PROGRESS NOTES
Phone: [address]      Fax: [phone]                            Outpatient Physical Therapy                                                                            Daily Note    Date: 2022  Patient Name: Rodo Mccracken        MRN: [id number]   ACCT#:  [de-identified]  : 1955  (78 y.o.)    Referring Provider (secondary): Dr. Starr Morgan         Diagnosis: Primary Osteoarthritis of R knee  Treatment Diagnosis: R knee pain S/p R TKR    Onset Date: 22  PT Insurance Information: [facility]  Total # of Visits Approved: 18 Per Physician Order  Total # of Visits to Date: 8  No Show: 0  Canceled Appointment: 0  Plan of Care/Certification Expiration Date: 22    Pre-Treatment Pain:  4/10     Assessment  Assessment: Pt arrived reporting his pain 4/10. Pt had c/o increased pain from last session blaming it on Tband exercises and said he would not complete. Had pt complete Tband exercises with 2# weight instead. Pt cntinues to state to discharge IFES. Completed tissue stretching to knee and scar this date. PROM knee flexion = 92 degrees. Plan  Continue with current plan of care    Exercises/Modalities/Manual:  See DocFlow Sheet    Education:           Goals  (Total # of Visits to Date: 8)   Short Term Goals  Time Frame for Short Term Goals: 9 visits  Short Term Goal 1: Pt to report independence  and compliance with HEP for ROM. STG Goal 1 Status[de-identified] Met  Short Term Goal 2: Pt to have PROM ext to 5deg FN to improve walking zina. Long Term Goals  Time Frame for Long Term Goals : 18 visits  Long Term Goal 1: Pt to improve LEFS score from 58/80 to >68/80 to improve ADLs. Long Term Goal 2: Pt to have >110deg of PROM flexion to improve stair management. Long Term Goal 3: Pt to maintain SLS on uneven surface 10sec 2:3 trials to improve outdoor safety. Long Term Goal 4: Pt to amb without AD >100ft without deviations to restore prior amb ability.     Post Treatment Pain:  7/10      Time In: 7329  Time Out: 0948  Timed Code Treatment Minutes: 43 Minutes  Total Treatment Time: 37 Minutes    Kaela Castro PTA     Date: 11/30/2022

## 2022-12-02 ENCOUNTER — HOSPITAL ENCOUNTER (OUTPATIENT)
Dept: PHYSICAL THERAPY | Age: 67
Setting detail: THERAPIES SERIES
Discharge: HOME | End: 2022-12-02
Payer: MEDICARE

## 2022-12-02 PROCEDURE — 97110 THERAPEUTIC EXERCISES: CPT

## 2022-12-02 PROCEDURE — 97112 NEUROMUSCULAR REEDUCATION: CPT

## 2022-12-02 ASSESSMENT — PAIN SCALES - GENERAL: PAINLEVEL_OUTOF10: 2

## 2022-12-02 NOTE — PROGRESS NOTES
Phone: [address]      Fax: [phone]                            Outpatient Physical Therapy                                                                            Daily Note    Date: 2022  Patient Name: Susan Ramos        MRN: [id number]   ACCT#:  [de-identified]  : 1955  (78 y.o.)    Referring Provider (secondary): Dr. Jessica Sheffield         Diagnosis: Primary Osteoarthritis of R knee  Treatment Diagnosis: R knee pain S/p R TKR    Onset Date: 22  PT Insurance Information: [facility]  Total # of Visits Approved: 18 Per Physician Order  Total # of Visits to Date: 9  No Show: 0  Canceled Appointment: 0  Plan of Care/Certification Expiration Date: 22    Pre-Treatment Pain:  2/10     Assessment  Assessment: PROM: R =86deg this date. Pt reports being quite active with increased swelling and tension this date. Pt educated on importance of activity moderation until ROM is 0-110deg. Pt also educated that until ROM numbers are reached D/C is not advised. Pt with fair zina to ther ex this date. Plan  Continue with current plan of care    Exercises/Modalities/Manual:  See DocFlow Sheet    Education: see assessment          Goals  (Total # of Visits to Date: 5)   Short Term Goals  Time Frame for Short Term Goals: 9 visits  Short Term Goal 1: Pt to report independence  and compliance with HEP for ROM. -MET  STG Goal 1 Status[de-identified] Met  Short Term Goal 2: Pt to have PROM ext to 5deg FN to improve walking zina. -NOT MET    Long Term Goals  Time Frame for Long Term Goals : 18 visits  Long Term Goal 1: Pt to improve LEFS score from 58/80 to >68/80 to improve ADLs. Long Term Goal 2: Pt to have >110deg of PROM flexion to improve stair management. Long Term Goal 3: Pt to maintain SLS on uneven surface 10sec 2:3 trials to improve outdoor safety. Long Term Goal 4: Pt to amb without AD >100ft without deviations to restore prior amb ability.     Post Treatment Pain: 4/10    Time In: 1504  Time Out: 1545  Timed Code Treatment Minutes: 41 Minutes  Total Treatment Time: 1404 [facility], [facility]     Date: 12/2/2022

## 2022-12-07 ENCOUNTER — HOSPITAL ENCOUNTER (OUTPATIENT)
Dept: PHYSICAL THERAPY | Age: 67
Setting detail: THERAPIES SERIES
Discharge: HOME | End: 2022-12-07
Payer: MEDICARE

## 2022-12-07 PROCEDURE — 97112 NEUROMUSCULAR REEDUCATION: CPT

## 2022-12-07 PROCEDURE — 97140 MANUAL THERAPY 1/> REGIONS: CPT

## 2022-12-07 PROCEDURE — 97110 THERAPEUTIC EXERCISES: CPT

## 2022-12-07 ASSESSMENT — PAIN SCALES - GENERAL: PAINLEVEL_OUTOF10: 3

## 2022-12-07 NOTE — PROGRESS NOTES
Phone: [address]      Fax: [phone]                            Outpatient Physical Therapy                                                                            Daily Note    Date: 2022  Patient Name: Darron Rice        MRN: [id number]   ACCT#:  [de-identified]  : 1955  (78 y.o.)    Referring Provider (secondary): Dr. Carla Soto         Diagnosis: Primary Osteoarthritis of R knee  Treatment Diagnosis: R knee pain S/p R TKR    Onset Date: 22  PT Insurance Information: [facility]  Total # of Visits Approved: 18 Per Physician Order  Total # of Visits to Date: 10  No Show: 0  Canceled Appointment: 0  Plan of Care/Certification Expiration Date: 22    Pre-Treatment Pain:  3/10     Assessment  Assessment: Patient enters facility stating  his pain is 3/10. Pt reports he was able to climb 13 stairs in reciprocal pattern when climbing up but contnues with step to pattern going down. Progressed to B/L LE shuttle this date and 2# weight on B/L LE on hip exercises as listed on log. PROM knee flexion = 93 degrees post tissue stretching. Plan to continue to progress per pt tolerance. Plan  Continue with current plan of care    Exercises/Modalities/Manual:  See DocFlow Sheet    Education:           Goals  (Total # of Visits to Date: 8)   Short Term Goals  Time Frame for Short Term Goals: 9 visits  Short Term Goal 1: Pt to report independence  and compliance with HEP for ROM. STG Goal 1 Status[de-identified] Met  Short Term Goal 2: Pt to have PROM ext to 5deg FN to improve walking zina. Long Term Goals  Time Frame for Long Term Goals : 18 visits  Long Term Goal 1: Pt to improve LEFS score from 58/80 to >68/80 to improve ADLs. Long Term Goal 2: Pt to have >110deg of PROM flexion to improve stair management. Long Term Goal 3: Pt to maintain SLS on uneven surface 10sec 2:3 trials to improve outdoor safety.   Long Term Goal 4: Pt to amb without AD >100ft without deviations to restore prior amb ability.     Post Treatment Pain:  1/10      Time In: 1120  Time Out: 1206  Timed Code Treatment Minutes: 46 Minutes  Total Treatment Time: 55 Minutes    Gus Perrin PTA     Date: 12/7/2022

## 2022-12-09 ENCOUNTER — HOSPITAL ENCOUNTER (OUTPATIENT)
Dept: PHYSICAL THERAPY | Age: 67
Setting detail: THERAPIES SERIES
Discharge: HOME | End: 2022-12-09
Payer: MEDICARE

## 2022-12-09 PROCEDURE — 97110 THERAPEUTIC EXERCISES: CPT

## 2022-12-09 ASSESSMENT — PAIN SCALES - GENERAL: PAINLEVEL_OUTOF10: 3

## 2022-12-09 NOTE — PROGRESS NOTES
Phone: [address]      Fax: [phone]                            Outpatient Physical Therapy                                                                            Daily Note    Date: 2022  Patient Name: Susan Ramos        MRN: [id number]   ACCT#:  [de-identified]  : 1955  (78 y.o.)    Referring Provider (secondary): Dr. Jessica Sheffield         Diagnosis: Primary Osteoarthritis of R knee  Treatment Diagnosis: R knee pain S/p R TKR    Onset Date: 22  PT Insurance Information: [facility]  Total # of Visits Approved: 18 Per Physician Order  Total # of Visits to Date: 11  No Show: 0  Canceled Appointment: 0  Plan of Care/Certification Expiration Date: 22    Pre-Treatment Pain:  3/10     Assessment  Assessment: Patient states R knee pain is a 3/10 this morning. Completed strengthening and ROM ex per flowsheet. Held tband exercises per patient request due to increased hip pain last visit. R knee PROM flex = 95 deg. Utilized alcohol pads to help remove access glue remaining over knee incision. Post session patient notes pain remains a 3/10. Plan  Continue with current plan of care    Exercises/Modalities/Manual:  See DocFlow Sheet    Education:     Goals  (Total # of Visits to Date: 6)   Short Term Goals  Time Frame for Short Term Goals: 9 visits  Short Term Goal 1: Pt to report independence  and compliance with HEP for ROM. STG Goal 1 Status[de-identified] Met  Short Term Goal 2: Pt to have PROM ext to 5deg FN to improve walking zina. Long Term Goals  Time Frame for Long Term Goals : 18 visits  Long Term Goal 1: Pt to improve LEFS score from 58/80 to >68/80 to improve ADLs. Long Term Goal 2: Pt to have >110deg of PROM flexion to improve stair management. Long Term Goal 3: Pt to maintain SLS on uneven surface 10sec 2:3 trials to improve outdoor safety. Long Term Goal 4: Pt to amb without AD >100ft without deviations to restore prior amb ability.     Post Treatment Pain:  3/10    Time In: 0948    Time Out : 1030   Timed Code Treatment Minutes: 42 Minutes  Total Treatment Time: [address] [place], [place]     Date: 12/9/2022

## 2022-12-09 NOTE — OP NOTE
[facility] ITZEL MOREL          Phone: [phone]      Outpatient Physical Therapy  Fax: [phone]                                 10th Visit Report    Date: 12/2/2022  ACCT #: [de-identified]      Referring Provider (secondary): Dr. Chris Willett         Diagnosis: Primary Osteoarthritis of R knee      Treatment Diagnosis: R knee pain S/p R TKR    Onset Date: 11/09/22  PT Insurance Information: [facility]  Total # of Visits Approved: 18 Per Physician Order  Total # of Visits to Date: 9  No Show: 0  Canceled Appointment: 0    Current Treatment:  Patient Education/HEP, Therapeutic Exercise, Manual Therapy: Myofacial Release/Cupping, Neuro Re-ed, Gait Training, HP/CP, and Electrical Stimulation    Skilled Intervention:  Body Structures, Functions, Activity Limitations Requiring Skilled Therapeutic Intervention: Decreased functional mobility , Decreased ADL status, Decreased ROM, Decreased strength, Decreased endurance, Decreased balance, Decreased high-level IADLs, Increased pain, Decreased posture  Assessment: PROM: R =86deg this date. Pt reports being quite active with increased swelling and tension this date. Pt educated on importance of activity moderation until ROM is 0-110deg. Pt also educated that until ROM numbers are reached D/C is not advised. Pt with fair zina to ther ex this date. Therapy Prognosis: Good  Improve Ambulation, Complete Daily Tasks Safely, and Return to Prior Level of Function    Expectations for Improvement/Time Frame:  Plan to cont x 3wks toward LTGs    Plan  Continue with current plan of care    Goals  Short Term Goals  Time Frame for Short Term Goals: 9 visits  Short Term Goal 1: Pt to report independence  and compliance with HEP for ROM. Short Term Goal 2: Pt to have PROM ext to 5deg FN to improve walking zina.     Long Term Goals  Time Frame for Long Term Goals : 18 visits  Long Term Goal 1: Pt to improve LEFS score from 58/80 to >68/80 to improve ADLs. Long Term Goal 2: Pt to have >110deg of PROM flexion to improve stair management. Long Term Goal 3: Pt to maintain SLS on uneven surface 10sec 2:3 trials to improve outdoor safety. Long Term Goal 4: Pt to amb without AD >100ft without deviations to restore prior amb ability.     Lisbeth Anne, PT      Date: 12/2/2022

## 2022-12-12 ENCOUNTER — HOSPITAL ENCOUNTER (OUTPATIENT)
Dept: PHYSICAL THERAPY | Age: 67
Setting detail: THERAPIES SERIES
Discharge: HOME | End: 2022-12-12
Payer: MEDICARE

## 2022-12-12 PROCEDURE — 97110 THERAPEUTIC EXERCISES: CPT

## 2022-12-12 PROCEDURE — 97140 MANUAL THERAPY 1/> REGIONS: CPT

## 2022-12-12 ASSESSMENT — PAIN SCALES - GENERAL: PAINLEVEL_OUTOF10: 2

## 2022-12-12 NOTE — PROGRESS NOTES
Phone: [address]cos      Fax: [phone]                            Outpatient Physical Therapy                                                                            Daily Note    Date: 2022  Patient Name: Naomi Jamil        MRN: [id number]   ACCT#:  [de-identified]  : 1955  (78 y.o.)    Referring Provider (secondary): Dr. Ren Ivory         Diagnosis: Primary Osteoarthritis of R knee  Treatment Diagnosis: R knee pain S/p R TKR    Onset Date: 22  PT Insurance Information: [facility]  Total # of Visits Approved: 18 Per Physician Order  Total # of Visits to Date: 12  No Show: 0  Canceled Appointment: 0  Plan of Care/Certification Expiration Date: 22    Pre-Treatment Pain:  2/10     Assessment  Assessment: PROM: 2-100deg this date, AROM ext 5deg FN. Pt amb without AD without deviations this date. Pt reports he will be beginning Chemotherapy soon for brain CA and is not sure he will be able to complete PT in addition to chemo. Pt follows up at the [facility] tomorrow to discuss chemo options. Will await results of tomorrow's appt prior to scheduling the next weeks PT. Plan  Continue with current plan of care    Exercises/Modalities/Manual:  See DocFlow Sheet    Education: Improved ROM, Tissue mobilization          Goals  (Total # of Visits to Date: 15)   Short Term Goals  Time Frame for Short Term Goals: 9 visits  Short Term Goal 1: Pt to report independence  and compliance with HEP for ROM. STG Goal 1 Status[de-identified] Met  Short Term Goal 2: Pt to have PROM ext to 5deg FN to improve walking zina. Long Term Goals  Time Frame for Long Term Goals : 18 visits  Long Term Goal 1: Pt to improve LEFS score from 58/80 to >68/80 to improve ADLs. Long Term Goal 2: Pt to have >110deg of PROM flexion to improve stair management. Long Term Goal 3: Pt to maintain SLS on uneven surface 10sec 2:3 trials to improve outdoor safety. -MET  Long Term Goal 4: Pt to amb without AD >100ft without deviations to restore prior amb ability. -MET    Post Treatment Pain:  2/10    Time In: 1121  Time Out: 1205  Timed Code Treatment Minutes: 44 Minutes  Total Treatment Time: [address] Arguello, PT     Date: 12/12/2022

## 2022-12-12 NOTE — PROGRESS NOTES
Phone: [address]            Fax: [phone]                             Outpatient Physical Therapy                                                                      Progress Report    Date: 2022   MRN: [id number]    ACCT#: [de-identified]  Patient: Kaushik Thompson  : 1955  Referring Provider (secondary): Dr. Keith Harvey         Diagnosis: Primary Osteoarthritis of R knee      Treatment Diagnosis: R knee pain S/p R TKR    Onset Date: 22  PT Insurance Information: [facility]  Total # of Visits Approved: 18 Per Physician Order  Total # of Visits to Date: 12  No Show: 0  Canceled Appointment: 0    Assessment  Pt amb without AD without deviations this date. PROM: 2-100deg this date, AROM ext 5deg FN. Pt reports he will be beginning Chemotherapy soon for brain CA and is not sure he will be able to complete PT in addition to chemo. Pt follows up at the [facility] tomorrow to discuss chemo options. Will await results of tomorrow's appt prior to scheduling the next weeks PT. Therapy Prognosis: Good    Plan  Continue with current plan of care pending result of oncology follow up    Goals  Short Term Goals  Time Frame for Short Term Goals: 9 visits  Short Term Goal 1: Pt to report independence  and compliance with HEP for ROM. Short Term Goal 2: Pt to have PROM ext to 5deg FN to improve walking zina. Long Term Goals  Time Frame for Long Term Goals : 18 visits  Long Term Goal 1: Pt to improve LEFS score from 58/80 to >68/80 to improve ADLs. Long Term Goal 2: Pt to have >110deg of PROM flexion to improve stair management. Long Term Goal 3: Pt to maintain SLS on uneven surface 10sec 2:3 trials to improve outdoor safety. -MET  Long Term Goal 4: Pt to amb without AD >100ft without deviations to restore prior amb ability. -MET    Radha Cash, PT    Date: 2022

## 2022-12-14 ENCOUNTER — HOSPITAL ENCOUNTER (OUTPATIENT)
Dept: PHYSICAL THERAPY | Age: 67
Setting detail: THERAPIES SERIES
Discharge: HOME | End: 2022-12-14
Payer: MEDICARE

## 2022-12-14 PROCEDURE — 97110 THERAPEUTIC EXERCISES: CPT

## 2022-12-14 ASSESSMENT — PAIN SCALES - GENERAL: PAINLEVEL_OUTOF10: 2

## 2022-12-14 NOTE — PROGRESS NOTES
Phone: [address]      Fax: [phone]                            Outpatient Physical Therapy                                                                            Daily Note    Date: 2022  Patient Name: aHo Larry        MRN: [id number]   ACCT#:  [de-identified]  : 1955  (78 y.o.)    Referring Provider (secondary): Dr. Arun Tompkins         Diagnosis: Primary Osteoarthritis of R knee  Treatment Diagnosis: R knee pain S/p R TKR    Onset Date: 22  PT Insurance Information: [facility]  Total # of Visits Approved: 18 Per Physician Order  Total # of Visits to Date: 13  No Show: 0  Canceled Appointment: 0  Plan of Care/Certification Expiration Date: 22    Pre-Treatment Pain:  2/10     Assessment  Assessment: Patient states R knee pain is a 2/10 this morning. Completed knee strengthening and ROM ex per flowsheet. R knee PROM flex = 100 deg. Patient saw oncologist re: brain tumor yesterday and they are going to begin chemotherapy first week of January. Oncologist said patient can continue with knee rehab without issue, but patient states he is going to try and sway Dr. Leyda Degroot into letting him finish therapy after next visit. Patient states because of feeling ill from chemotherapy he thinks it will be too much on top of therapy. Plan to tentatively continue x1 visit and D/C to HEP. Plan  Continue with current plan of care    Exercises/Modalities/Manual:  See DocFlow Sheet    Education:     Goals  (Total # of Visits to Date: 15)   Short Term Goals  Time Frame for Short Term Goals: 9 visits  Short Term Goal 1: Pt to report independence  and compliance with HEP for ROM. STG Goal 1 Status[de-identified] Met  Short Term Goal 2: Pt to have PROM ext to 5deg FN to improve walking zina.     Long Term Goals  Time Frame for Long Term Goals : 18 visits  Long Term Goal 1: Pt to improve LEFS score from 58/80 to >68/80 to improve ADLs. - NOT MET  Long Term Goal 2: Pt to have >110deg of PROM flexion to improve stair management. Long Term Goal 3: Pt to maintain SLS on uneven surface 10sec 2:3 trials to improve outdoor safety. -MET  Long Term Goal 4: Pt to amb without AD >100ft without deviations to restore prior amb ability. -MET    Post Treatment Pain:  2/10    Time In: 1030    Time Out : 1115   Timed Code Treatment Minutes: 45 Minutes  Total Treatment Time: [address], [place]     Date: 12/14/2022

## 2022-12-20 ENCOUNTER — APPOINTMENT (OUTPATIENT)
Dept: PHYSICAL THERAPY | Age: 67
End: 2022-12-20
Payer: MEDICARE

## 2022-12-29 NOTE — DISCHARGE SUMMARY
Phone: [address]             Fax: [phone]                            Outpatient Physical Therapy                                                                    Discharge Summary    Patient: Thuan Cruz  : 1955  ACCT #: [de-identified]   Referring Provider (secondary): Dr. Josette Henriquez      Diagnosis: Primary Osteoarthritis of R knee    Date Treatment Initiated: 22  Date of Last Treatment: 22    PT Visit Information  Onset Date: 22  PT Insurance Information: [facility]  Total # of Visits Approved: 18  Total # of Visits to Date: 13  Plan of Care/Certification Expiration Date: 22  No Show: 0  Progress Note Due Date: 22  Canceled Appointment: 0  Referring Provider (secondary): Dr. Josette Henriquez    Frequency/Duration  Days: 3 times per week  Weeks: 6 weeks    Treatment Received  Patient Education/HEP, Therapeutic Exercise, Manual Therapy: Myofacial Release/Cupping, Manual Therapy: Mobilization/Manipulation, Neuro Re-ed, and Gait Training    Pain Level: 2    Assessment  Pt amb without AD without deviations this date. PROM: 2-100deg this date, AROM ext 5deg FN. Pt d/c to HEP to complete as able. Reason for Discharge  Optimal Function Achieved - Pt cleared by physician to d/c PT to prepare for chemotherapy    Comments:   Thank you for this referral      Mindy Cook, PT  Date: 2022

## 2023-04-17 ENCOUNTER — HOSPITAL ENCOUNTER (OUTPATIENT)
Age: 68
Discharge: HOME | End: 2023-04-17
Payer: MEDICARE

## 2023-04-17 LAB
ABSOLUTE EOS #: 0.06 K/UL (ref 0–0.4)
ABSOLUTE LYMPH #: 0.42 K/UL (ref 1–4.8)
ABSOLUTE MONO #: 0.16 K/UL (ref 0–1)
ALBUMIN SERPL-MCNC: 4.5 G/DL (ref 3.5–5.2)
ALP SERPL-CCNC: 65 U/L (ref 40–129)
ALT SERPL-CCNC: 26 U/L (ref 5–41)
ANION GAP SERPL CALCULATED.3IONS-SCNC: 9 MMOL/L (ref 9–17)
AST SERPL-CCNC: 20 U/L
BASOPHILS # BLD: ABNORMAL % (ref 0–2)
BASOPHILS ABSOLUTE: ABNORMAL K/UL (ref 0–0.2)
BILIRUB SERPL-MCNC: 0.6 MG/DL (ref 0.3–1.2)
BUN SERPL-MCNC: 19 MG/DL (ref 8–23)
BUN/CREAT BLD: 30 (ref 9–20)
CALCIUM SERPL-MCNC: 9 MG/DL (ref 8.6–10.4)
CHLORIDE SERPL-SCNC: 108 MMOL/L (ref 98–107)
CO2 SERPL-SCNC: 26 MMOL/L (ref 20–31)
CREAT SERPL-MCNC: 0.64 MG/DL (ref 0.7–1.2)
EOSINOPHILS RELATIVE PERCENT: 4 % (ref 0–5)
GFR SERPL CREATININE-BSD FRML MDRD: >60 ML/MIN/1.73M2
GLUCOSE SERPL-MCNC: 121 MG/DL (ref 70–99)
HCT VFR BLD AUTO: 33.8 % (ref 41–53)
HGB BLD-MCNC: 11.7 G/DL (ref 13.5–17.5)
LYMPHOCYTES # BLD: 26 % (ref 13–44)
MCH RBC QN AUTO: 32.5 PG (ref 26–34)
MCHC RBC AUTO-ENTMCNC: 34.6 G/DL (ref 31–37)
MCV RBC AUTO: 94 FL (ref 80–100)
MONOCYTES # BLD: 10 % (ref 5–9)
MORPHOLOGY: ABNORMAL
PDW BLD-RTO: 17.8 % (ref 12.1–15.2)
PLATELET # BLD AUTO: 207 K/UL (ref 140–450)
POTASSIUM SERPL-SCNC: 4 MMOL/L (ref 3.7–5.3)
PROT SERPL-MCNC: 6.8 G/DL (ref 6.4–8.3)
RBC # BLD: 3.6 M/UL (ref 4.5–5.9)
SEG NEUTROPHILS: 60 % (ref 39–75)
SEGMENTED NEUTROPHILS ABSOLUTE COUNT: 0.96 K/UL (ref 2.1–6.5)
SODIUM SERPL-SCNC: 143 MMOL/L (ref 135–144)
WBC # BLD AUTO: 1.6 K/UL (ref 3.5–11)

## 2023-04-17 PROCEDURE — 80053 COMPREHEN METABOLIC PANEL: CPT

## 2023-04-17 PROCEDURE — 85025 COMPLETE CBC W/AUTO DIFF WBC: CPT

## 2023-04-17 PROCEDURE — 36415 COLL VENOUS BLD VENIPUNCTURE: CPT

## 2023-04-24 ENCOUNTER — HOSPITAL ENCOUNTER (OUTPATIENT)
Age: 68
Discharge: HOME | End: 2023-04-24
Payer: MEDICARE

## 2023-04-24 LAB
ABSOLUTE EOS #: 0.02 K/UL (ref 0–0.4)
ABSOLUTE LYMPH #: 0.51 K/UL (ref 1–4.8)
ABSOLUTE MONO #: 0.27 K/UL (ref 0–1)
ALBUMIN SERPL-MCNC: 4.4 G/DL (ref 3.5–5.2)
ALP SERPL-CCNC: 62 U/L (ref 40–129)
ALT SERPL-CCNC: 24 U/L (ref 5–41)
ANION GAP SERPL CALCULATED.3IONS-SCNC: 9 MMOL/L (ref 9–17)
AST SERPL-CCNC: 25 U/L
BASOPHILS # BLD: ABNORMAL % (ref 0–2)
BASOPHILS ABSOLUTE: ABNORMAL K/UL (ref 0–0.2)
BILIRUB SERPL-MCNC: 0.5 MG/DL (ref 0.3–1.2)
BUN SERPL-MCNC: 16 MG/DL (ref 8–23)
BUN/CREAT BLD: 26 (ref 9–20)
CALCIUM SERPL-MCNC: 9 MG/DL (ref 8.6–10.4)
CHLORIDE SERPL-SCNC: 105 MMOL/L (ref 98–107)
CO2 SERPL-SCNC: 25 MMOL/L (ref 20–31)
CREAT SERPL-MCNC: 0.62 MG/DL (ref 0.7–1.2)
EOSINOPHILS RELATIVE PERCENT: 1 % (ref 0–5)
GFR SERPL CREATININE-BSD FRML MDRD: >60 ML/MIN/1.73M2
GLUCOSE SERPL-MCNC: 101 MG/DL (ref 70–99)
HCT VFR BLD AUTO: 34.1 % (ref 41–53)
HGB BLD-MCNC: 11.6 G/DL (ref 13.5–17.5)
LYMPHOCYTES # BLD: 34 % (ref 13–44)
MCH RBC QN AUTO: 32.2 PG (ref 26–34)
MCHC RBC AUTO-ENTMCNC: 34 G/DL (ref 31–37)
MCV RBC AUTO: 94.8 FL (ref 80–100)
MONOCYTES # BLD: 18 % (ref 5–9)
MORPHOLOGY: ABNORMAL
MORPHOLOGY: ABNORMAL
PDW BLD-RTO: 18 % (ref 12.1–15.2)
PLATELET # BLD AUTO: 222 K/UL (ref 140–450)
POTASSIUM SERPL-SCNC: 3.7 MMOL/L (ref 3.7–5.3)
PROT SERPL-MCNC: 6.4 G/DL (ref 6.4–8.3)
RBC # BLD: 3.6 M/UL (ref 4.5–5.9)
SEG NEUTROPHILS: 47 % (ref 39–75)
SEGMENTED NEUTROPHILS ABSOLUTE COUNT: 0.7 K/UL (ref 2.1–6.5)
SODIUM SERPL-SCNC: 139 MMOL/L (ref 135–144)
WBC # BLD AUTO: 1.5 K/UL (ref 3.5–11)

## 2023-04-24 PROCEDURE — 80053 COMPREHEN METABOLIC PANEL: CPT

## 2023-04-24 PROCEDURE — 85025 COMPLETE CBC W/AUTO DIFF WBC: CPT

## 2023-04-24 PROCEDURE — 36415 COLL VENOUS BLD VENIPUNCTURE: CPT

## 2023-05-01 ENCOUNTER — HOSPITAL ENCOUNTER (OUTPATIENT)
Age: 68
Discharge: HOME | End: 2023-05-01
Payer: MEDICARE

## 2023-05-01 LAB
ABSOLUTE EOS #: 0 K/UL (ref 0–0.4)
ABSOLUTE LYMPH #: 0.7 K/UL (ref 1–4.8)
ABSOLUTE MONO #: 0.4 K/UL (ref 0–1)
ALBUMIN SERPL-MCNC: 4.6 G/DL (ref 3.5–5.2)
ALP SERPL-CCNC: 61 U/L (ref 40–129)
ALT SERPL-CCNC: 27 U/L (ref 5–41)
ANION GAP SERPL CALCULATED.3IONS-SCNC: 11 MMOL/L (ref 9–17)
AST SERPL-CCNC: 23 U/L
BASOPHILS # BLD: 1 % (ref 0–2)
BASOPHILS ABSOLUTE: 0 K/UL (ref 0–0.2)
BILIRUB SERPL-MCNC: 0.6 MG/DL (ref 0.3–1.2)
BUN SERPL-MCNC: 15 MG/DL (ref 8–23)
BUN/CREAT BLD: 21 (ref 9–20)
CALCIUM SERPL-MCNC: 9.7 MG/DL (ref 8.6–10.4)
CHLORIDE SERPL-SCNC: 104 MMOL/L (ref 98–107)
CO2 SERPL-SCNC: 25 MMOL/L (ref 20–31)
CREAT SERPL-MCNC: 0.72 MG/DL (ref 0.7–1.2)
DIFFERENTIAL TYPE: YES
EOSINOPHILS RELATIVE PERCENT: 1 % (ref 0–5)
GFR SERPL CREATININE-BSD FRML MDRD: >60 ML/MIN/1.73M2
GLUCOSE SERPL-MCNC: 98 MG/DL (ref 70–99)
HCT VFR BLD AUTO: 37.9 % (ref 41–53)
HGB BLD-MCNC: 12.8 G/DL (ref 13.5–17.5)
LYMPHOCYTES # BLD: 20 % (ref 13–44)
MCH RBC QN AUTO: 32.1 PG (ref 26–34)
MCHC RBC AUTO-ENTMCNC: 33.7 G/DL (ref 31–37)
MCV RBC AUTO: 95.4 FL (ref 80–100)
MONOCYTES # BLD: 13 % (ref 5–9)
PDW BLD-RTO: 16.9 % (ref 12.1–15.2)
PLATELET # BLD AUTO: 198 K/UL (ref 140–450)
POTASSIUM SERPL-SCNC: 4 MMOL/L (ref 3.7–5.3)
PROT SERPL-MCNC: 6.6 G/DL (ref 6.4–8.3)
RBC # BLD: 3.98 M/UL (ref 4.5–5.9)
SEG NEUTROPHILS: 65 % (ref 39–75)
SEGMENTED NEUTROPHILS ABSOLUTE COUNT: 2.1 K/UL (ref 2.1–6.5)
SODIUM SERPL-SCNC: 140 MMOL/L (ref 135–144)
WBC # BLD AUTO: 3.2 K/UL (ref 3.5–11)

## 2023-05-01 PROCEDURE — 85025 COMPLETE CBC W/AUTO DIFF WBC: CPT

## 2023-05-01 PROCEDURE — 36415 COLL VENOUS BLD VENIPUNCTURE: CPT

## 2023-05-01 PROCEDURE — 80053 COMPREHEN METABOLIC PANEL: CPT

## 2023-05-08 ENCOUNTER — HOSPITAL ENCOUNTER (OUTPATIENT)
Age: 68
Discharge: HOME | End: 2023-05-08
Payer: MEDICARE

## 2023-05-08 LAB
ABSOLUTE EOS #: 0.1 K/UL (ref 0–0.4)
ABSOLUTE LYMPH #: 0.6 K/UL (ref 1–4.8)
ABSOLUTE MONO #: 0.3 K/UL (ref 0–1)
ALBUMIN SERPL-MCNC: 4.5 G/DL (ref 3.5–5.2)
ALP SERPL-CCNC: 65 U/L (ref 40–129)
ALT SERPL-CCNC: 26 U/L (ref 5–41)
ANION GAP SERPL CALCULATED.3IONS-SCNC: 11 MMOL/L (ref 9–17)
AST SERPL-CCNC: 23 U/L
BASOPHILS # BLD: 0 % (ref 0–2)
BASOPHILS ABSOLUTE: 0 K/UL (ref 0–0.2)
BILIRUB SERPL-MCNC: 0.9 MG/DL (ref 0.3–1.2)
BUN SERPL-MCNC: 20 MG/DL (ref 8–23)
BUN/CREAT BLD: 29 (ref 9–20)
CALCIUM SERPL-MCNC: 9.9 MG/DL (ref 8.6–10.4)
CHLORIDE SERPL-SCNC: 103 MMOL/L (ref 98–107)
CO2 SERPL-SCNC: 24 MMOL/L (ref 20–31)
CREAT SERPL-MCNC: 0.68 MG/DL (ref 0.7–1.2)
DIFFERENTIAL TYPE: YES
EOSINOPHILS RELATIVE PERCENT: 2 % (ref 0–5)
GFR SERPL CREATININE-BSD FRML MDRD: >60 ML/MIN/1.73M2
GLUCOSE SERPL-MCNC: 109 MG/DL (ref 70–99)
HCT VFR BLD AUTO: 38.9 % (ref 41–53)
HGB BLD-MCNC: 13.3 G/DL (ref 13.5–17.5)
LYMPHOCYTES # BLD: 13 % (ref 13–44)
MCH RBC QN AUTO: 32.6 PG (ref 26–34)
MCHC RBC AUTO-ENTMCNC: 34.1 G/DL (ref 31–37)
MCV RBC AUTO: 95.7 FL (ref 80–100)
MONOCYTES # BLD: 8 % (ref 5–9)
PDW BLD-RTO: 16.3 % (ref 12.1–15.2)
PLATELET # BLD AUTO: 165 K/UL (ref 140–450)
POTASSIUM SERPL-SCNC: 3.9 MMOL/L (ref 3.7–5.3)
PROT SERPL-MCNC: 6.9 G/DL (ref 6.4–8.3)
RBC # BLD: 4.07 M/UL (ref 4.5–5.9)
SEG NEUTROPHILS: 77 % (ref 39–75)
SEGMENTED NEUTROPHILS ABSOLUTE COUNT: 3.2 K/UL (ref 2.1–6.5)
SODIUM SERPL-SCNC: 138 MMOL/L (ref 135–144)
WBC # BLD AUTO: 4.2 K/UL (ref 3.5–11)

## 2023-05-08 PROCEDURE — 80053 COMPREHEN METABOLIC PANEL: CPT

## 2023-05-08 PROCEDURE — 36415 COLL VENOUS BLD VENIPUNCTURE: CPT

## 2023-05-08 PROCEDURE — 85025 COMPLETE CBC W/AUTO DIFF WBC: CPT

## 2023-05-15 ENCOUNTER — HOSPITAL ENCOUNTER (OUTPATIENT)
Age: 68
Discharge: HOME | End: 2023-05-15
Payer: MEDICARE

## 2023-05-15 LAB
ABSOLUTE EOS #: 0.2 K/UL (ref 0–0.4)
ABSOLUTE LYMPH #: 0.4 K/UL (ref 1–4.8)
ABSOLUTE MONO #: 0.3 K/UL (ref 0–1)
ALBUMIN SERPL-MCNC: 4.1 G/DL (ref 3.5–5.2)
ALP SERPL-CCNC: 59 U/L (ref 40–129)
ALT SERPL-CCNC: 22 U/L (ref 5–41)
ANION GAP SERPL CALCULATED.3IONS-SCNC: 10 MMOL/L (ref 9–17)
AST SERPL-CCNC: 20 U/L
BASOPHILS # BLD: 0 % (ref 0–2)
BASOPHILS ABSOLUTE: 0 K/UL (ref 0–0.2)
BILIRUB SERPL-MCNC: 0.5 MG/DL (ref 0.3–1.2)
BUN SERPL-MCNC: 16 MG/DL (ref 8–23)
BUN/CREAT BLD: 23 (ref 9–20)
CALCIUM SERPL-MCNC: 8.8 MG/DL (ref 8.6–10.4)
CHLORIDE SERPL-SCNC: 105 MMOL/L (ref 98–107)
CO2 SERPL-SCNC: 24 MMOL/L (ref 20–31)
CREAT SERPL-MCNC: 0.69 MG/DL (ref 0.7–1.2)
DIFFERENTIAL TYPE: YES
EOSINOPHILS RELATIVE PERCENT: 6 % (ref 0–5)
GFR SERPL CREATININE-BSD FRML MDRD: >60 ML/MIN/1.73M2
GLUCOSE SERPL-MCNC: 122 MG/DL (ref 70–99)
HCT VFR BLD AUTO: 36.1 % (ref 41–53)
HGB BLD-MCNC: 12.3 G/DL (ref 13.5–17.5)
LYMPHOCYTES # BLD: 13 % (ref 13–44)
MCH RBC QN AUTO: 33 PG (ref 26–34)
MCHC RBC AUTO-ENTMCNC: 34.1 G/DL (ref 31–37)
MCV RBC AUTO: 96.9 FL (ref 80–100)
MONOCYTES # BLD: 9 % (ref 5–9)
PDW BLD-RTO: 16.3 % (ref 12.1–15.2)
PLATELET # BLD AUTO: 152 K/UL (ref 140–450)
POTASSIUM SERPL-SCNC: 3.6 MMOL/L (ref 3.7–5.3)
PROT SERPL-MCNC: 6.1 G/DL (ref 6.4–8.3)
RBC # BLD: 3.72 M/UL (ref 4.5–5.9)
SEG NEUTROPHILS: 72 % (ref 39–75)
SEGMENTED NEUTROPHILS ABSOLUTE COUNT: 2.4 K/UL (ref 2.1–6.5)
SODIUM SERPL-SCNC: 139 MMOL/L (ref 135–144)
WBC # BLD AUTO: 3.3 K/UL (ref 3.5–11)

## 2023-05-15 PROCEDURE — 85025 COMPLETE CBC W/AUTO DIFF WBC: CPT

## 2023-05-15 PROCEDURE — 36415 COLL VENOUS BLD VENIPUNCTURE: CPT

## 2023-05-15 PROCEDURE — 80053 COMPREHEN METABOLIC PANEL: CPT

## 2023-05-22 ENCOUNTER — HOSPITAL ENCOUNTER (OUTPATIENT)
Age: 68
Discharge: HOME | End: 2023-05-22
Payer: MEDICARE

## 2023-05-22 LAB
ALBUMIN SERPL-MCNC: 4.5 G/DL (ref 3.5–5.2)
ALP SERPL-CCNC: 64 U/L (ref 40–129)
ALT SERPL-CCNC: 32 U/L (ref 5–41)
ANION GAP SERPL CALCULATED.3IONS-SCNC: 10 MMOL/L (ref 9–17)
AST SERPL-CCNC: 26 U/L
BASOPHILS # BLD: 0 K/UL (ref 0–0.2)
BASOPHILS NFR BLD: 0 % (ref 0–2)
BILIRUB SERPL-MCNC: 0.7 MG/DL (ref 0.3–1.2)
BUN SERPL-MCNC: 16 MG/DL (ref 8–23)
BUN/CREAT SERPL: 23 (ref 9–20)
CALCIUM SERPL-MCNC: 9.7 MG/DL (ref 8.6–10.4)
CHLORIDE SERPL-SCNC: 105 MMOL/L (ref 98–107)
CO2 SERPL-SCNC: 27 MMOL/L (ref 20–31)
CREAT SERPL-MCNC: 0.7 MG/DL (ref 0.7–1.2)
EOSINOPHIL # BLD: 0.2 K/UL (ref 0–0.4)
EOSINOPHILS RELATIVE PERCENT: 6 % (ref 0–5)
ERYTHROCYTE [DISTWIDTH] IN BLOOD BY AUTOMATED COUNT: 15.8 % (ref 12.1–15.2)
GFR SERPL CREATININE-BSD FRML MDRD: >60 ML/MIN/1.73M2
GLUCOSE SERPL-MCNC: 93 MG/DL (ref 70–99)
HCT VFR BLD AUTO: 36.9 % (ref 41–53)
HGB BLD-MCNC: 12.7 G/DL (ref 13.5–17.5)
LYMPHOCYTES # BLD: 12 % (ref 13–44)
LYMPHOCYTES NFR BLD: 0.4 K/UL (ref 1–4.8)
MCH RBC QN AUTO: 33.4 PG (ref 26–34)
MCHC RBC AUTO-ENTMCNC: 34.3 G/DL (ref 31–37)
MCV RBC AUTO: 97.3 FL (ref 80–100)
MONOCYTES NFR BLD: 0.4 K/UL (ref 0–1)
MONOCYTES NFR BLD: 11 % (ref 5–9)
MORPHOLOGY: ABNORMAL
NEUTROPHILS NFR BLD: 71 % (ref 39–75)
NEUTS SEG NFR BLD: 2.6 K/UL (ref 2.1–6.5)
PLATELET # BLD AUTO: 86 K/UL (ref 140–450)
POTASSIUM SERPL-SCNC: 3.6 MMOL/L (ref 3.7–5.3)
PROT SERPL-MCNC: 6.7 G/DL (ref 6.4–8.3)
RBC # BLD AUTO: 3.79 M/UL (ref 4.5–5.9)
SODIUM SERPL-SCNC: 142 MMOL/L (ref 135–144)
WBC OTHER # BLD: 3.7 K/UL (ref 3.5–11)

## 2023-05-22 PROCEDURE — 36415 COLL VENOUS BLD VENIPUNCTURE: CPT

## 2023-05-22 PROCEDURE — 85025 COMPLETE CBC W/AUTO DIFF WBC: CPT

## 2023-05-22 PROCEDURE — 80053 COMPREHEN METABOLIC PANEL: CPT

## 2023-05-30 ENCOUNTER — HOSPITAL ENCOUNTER (OUTPATIENT)
Age: 68
Discharge: HOME | End: 2023-05-30
Payer: MEDICARE

## 2023-05-30 LAB
ALBUMIN SERPL-MCNC: 4.4 G/DL (ref 3.5–5.2)
ALP SERPL-CCNC: 63 U/L (ref 40–129)
ALT SERPL-CCNC: 23 U/L (ref 5–41)
ANION GAP SERPL CALCULATED.3IONS-SCNC: 11 MMOL/L (ref 9–17)
AST SERPL-CCNC: 21 U/L
BASOPHILS # BLD: 0 K/UL (ref 0–0.2)
BASOPHILS NFR BLD: 1 % (ref 0–2)
BILIRUB SERPL-MCNC: 0.4 MG/DL (ref 0.3–1.2)
BUN SERPL-MCNC: 21 MG/DL (ref 8–23)
BUN/CREAT SERPL: 33 (ref 9–20)
CALCIUM SERPL-MCNC: 9.2 MG/DL (ref 8.6–10.4)
CHLORIDE SERPL-SCNC: 105 MMOL/L (ref 98–107)
CO2 SERPL-SCNC: 24 MMOL/L (ref 20–31)
CREAT SERPL-MCNC: 0.64 MG/DL (ref 0.7–1.2)
EOSINOPHIL # BLD: 0.2 K/UL (ref 0–0.4)
EOSINOPHILS RELATIVE PERCENT: 4 % (ref 0–5)
ERYTHROCYTE [DISTWIDTH] IN BLOOD BY AUTOMATED COUNT: 15.1 % (ref 12.1–15.2)
GFR SERPL CREATININE-BSD FRML MDRD: >60 ML/MIN/1.73M2
GLUCOSE SERPL-MCNC: 95 MG/DL (ref 70–99)
HCT VFR BLD AUTO: 35.6 % (ref 41–53)
HGB BLD-MCNC: 12.3 G/DL (ref 13.5–17.5)
LYMPHOCYTES # BLD: 12 % (ref 13–44)
LYMPHOCYTES NFR BLD: 0.5 K/UL (ref 1–4.8)
MCH RBC QN AUTO: 33.4 PG (ref 26–34)
MCHC RBC AUTO-ENTMCNC: 34.6 G/DL (ref 31–37)
MCV RBC AUTO: 96.5 FL (ref 80–100)
MONOCYTES NFR BLD: 0.5 K/UL (ref 0–1)
MONOCYTES NFR BLD: 11 % (ref 5–9)
MORPHOLOGY: ABNORMAL
NEUTROPHILS NFR BLD: 72 % (ref 39–75)
NEUTS SEG NFR BLD: 3 K/UL (ref 2.1–6.5)
PLATELET # BLD AUTO: 80 K/UL (ref 140–450)
POTASSIUM SERPL-SCNC: 3.8 MMOL/L (ref 3.7–5.3)
PROT SERPL-MCNC: 6.3 G/DL (ref 6.4–8.3)
RBC # BLD AUTO: 3.69 M/UL (ref 4.5–5.9)
SODIUM SERPL-SCNC: 140 MMOL/L (ref 135–144)
WBC OTHER # BLD: 4.1 K/UL (ref 3.5–11)

## 2023-05-30 PROCEDURE — 36415 COLL VENOUS BLD VENIPUNCTURE: CPT

## 2023-05-30 PROCEDURE — 80053 COMPREHEN METABOLIC PANEL: CPT

## 2023-05-30 PROCEDURE — 85025 COMPLETE CBC W/AUTO DIFF WBC: CPT

## 2023-06-05 ENCOUNTER — HOSPITAL ENCOUNTER (OUTPATIENT)
Age: 68
Discharge: HOME | End: 2023-06-05
Payer: MEDICARE

## 2023-06-05 LAB
ALBUMIN SERPL-MCNC: 4.5 G/DL (ref 3.5–5.2)
ALP SERPL-CCNC: 66 U/L (ref 40–129)
ALT SERPL-CCNC: 28 U/L (ref 5–41)
ANION GAP SERPL CALCULATED.3IONS-SCNC: 11 MMOL/L (ref 9–17)
AST SERPL-CCNC: 20 U/L
BASOPHILS # BLD: 0 K/UL (ref 0–0.2)
BASOPHILS NFR BLD: 0 % (ref 0–2)
BILIRUB SERPL-MCNC: 0.6 MG/DL (ref 0.3–1.2)
BUN SERPL-MCNC: 21 MG/DL (ref 8–23)
BUN/CREAT SERPL: 35 (ref 9–20)
CALCIUM SERPL-MCNC: 9.4 MG/DL (ref 8.6–10.4)
CHLORIDE SERPL-SCNC: 108 MMOL/L (ref 98–107)
CO2 SERPL-SCNC: 23 MMOL/L (ref 20–31)
CREAT SERPL-MCNC: 0.6 MG/DL (ref 0.7–1.2)
EOSINOPHIL # BLD: 0.2 K/UL (ref 0–0.4)
EOSINOPHILS RELATIVE PERCENT: 5 % (ref 0–5)
ERYTHROCYTE [DISTWIDTH] IN BLOOD BY AUTOMATED COUNT: 14.6 % (ref 12.1–15.2)
GFR SERPL CREATININE-BSD FRML MDRD: >60 ML/MIN/1.73M2
GLUCOSE SERPL-MCNC: 111 MG/DL (ref 70–99)
HCT VFR BLD AUTO: 36.8 % (ref 41–53)
HGB BLD-MCNC: 12.7 G/DL (ref 13.5–17.5)
LYMPHOCYTES # BLD: 14 % (ref 13–44)
LYMPHOCYTES NFR BLD: 0.5 K/UL (ref 1–4.8)
MCH RBC QN AUTO: 33.1 PG (ref 26–34)
MCHC RBC AUTO-ENTMCNC: 34.4 G/DL (ref 31–37)
MCV RBC AUTO: 96.4 FL (ref 80–100)
MONOCYTES NFR BLD: 0.3 K/UL (ref 0–1)
MONOCYTES NFR BLD: 9 % (ref 5–9)
MORPHOLOGY: ABNORMAL
NEUTROPHILS NFR BLD: 72 % (ref 39–75)
NEUTS SEG NFR BLD: 2.5 K/UL (ref 2.1–6.5)
PLATELET # BLD AUTO: 72 K/UL (ref 140–450)
POTASSIUM SERPL-SCNC: 3.6 MMOL/L (ref 3.7–5.3)
PROT SERPL-MCNC: 6.4 G/DL (ref 6.4–8.3)
RBC # BLD AUTO: 3.82 M/UL (ref 4.5–5.9)
SODIUM SERPL-SCNC: 142 MMOL/L (ref 135–144)
WBC OTHER # BLD: 3.5 K/UL (ref 3.5–11)

## 2023-06-05 PROCEDURE — 36415 COLL VENOUS BLD VENIPUNCTURE: CPT

## 2023-06-05 PROCEDURE — 80053 COMPREHEN METABOLIC PANEL: CPT

## 2023-06-05 PROCEDURE — 85027 COMPLETE CBC AUTOMATED: CPT

## 2023-06-19 ENCOUNTER — HOSPITAL ENCOUNTER (OUTPATIENT)
Age: 68
Discharge: HOME | End: 2023-06-19
Payer: MEDICARE

## 2023-06-19 LAB
ALBUMIN SERPL-MCNC: 4.3 G/DL (ref 3.5–5.2)
ALP SERPL-CCNC: 62 U/L (ref 40–129)
ALT SERPL-CCNC: 25 U/L (ref 5–41)
ANION GAP SERPL CALCULATED.3IONS-SCNC: 11 MMOL/L (ref 9–17)
AST SERPL-CCNC: 20 U/L
BASOPHILS # BLD: 0.02 K/UL (ref 0–0.2)
BASOPHILS NFR BLD: 1 % (ref 0–2)
BILIRUB SERPL-MCNC: 0.6 MG/DL (ref 0.3–1.2)
BUN SERPL-MCNC: 16 MG/DL (ref 8–23)
BUN/CREAT SERPL: 25 (ref 9–20)
CALCIUM SERPL-MCNC: 9.2 MG/DL (ref 8.6–10.4)
CHLORIDE SERPL-SCNC: 103 MMOL/L (ref 98–107)
CO2 SERPL-SCNC: 25 MMOL/L (ref 20–31)
CREAT SERPL-MCNC: 0.65 MG/DL (ref 0.7–1.2)
EOSINOPHIL # BLD: 0.1 K/UL (ref 0–0.4)
EOSINOPHILS RELATIVE PERCENT: 4 % (ref 0–5)
ERYTHROCYTE [DISTWIDTH] IN BLOOD BY AUTOMATED COUNT: 13.9 % (ref 12.1–15.2)
GFR SERPL CREATININE-BSD FRML MDRD: >60 ML/MIN/1.73M2
GLUCOSE SERPL-MCNC: 95 MG/DL (ref 70–99)
HCT VFR BLD AUTO: 36.8 % (ref 41–53)
HGB BLD-MCNC: 12.8 G/DL (ref 13.5–17.5)
LYMPHOCYTES # BLD: 20 % (ref 13–44)
LYMPHOCYTES NFR BLD: 0.48 K/UL (ref 1–4.8)
MCH RBC QN AUTO: 33.5 PG (ref 26–34)
MCHC RBC AUTO-ENTMCNC: 34.6 G/DL (ref 31–37)
MCV RBC AUTO: 96.9 FL (ref 80–100)
MONOCYTES NFR BLD: 0.24 K/UL (ref 0–1)
MONOCYTES NFR BLD: 10 % (ref 5–9)
MORPHOLOGY: ABNORMAL
NEUTROPHILS NFR BLD: 65 % (ref 39–75)
NEUTS SEG NFR BLD: 1.56 K/UL (ref 2.1–6.5)
PLATELET # BLD AUTO: 155 K/UL (ref 140–450)
POTASSIUM SERPL-SCNC: 3.9 MMOL/L (ref 3.7–5.3)
PROT SERPL-MCNC: 6.5 G/DL (ref 6.4–8.3)
RBC # BLD AUTO: 3.8 M/UL (ref 4.5–5.9)
SODIUM SERPL-SCNC: 139 MMOL/L (ref 135–144)
WBC OTHER # BLD: 2.4 K/UL (ref 3.5–11)

## 2023-06-19 PROCEDURE — 80053 COMPREHEN METABOLIC PANEL: CPT

## 2023-06-19 PROCEDURE — 36415 COLL VENOUS BLD VENIPUNCTURE: CPT

## 2023-06-19 PROCEDURE — 85027 COMPLETE CBC AUTOMATED: CPT

## 2023-06-26 ENCOUNTER — HOSPITAL ENCOUNTER (OUTPATIENT)
Age: 68
Discharge: HOME | End: 2023-06-26
Payer: MEDICARE

## 2023-06-26 LAB
ALBUMIN SERPL-MCNC: 4.5 G/DL (ref 3.5–5.2)
ALP SERPL-CCNC: 61 U/L (ref 40–129)
ALT SERPL-CCNC: 22 U/L (ref 5–41)
ANION GAP SERPL CALCULATED.3IONS-SCNC: 13 MMOL/L (ref 9–17)
AST SERPL-CCNC: 19 U/L
BASOPHILS # BLD: 0.02 K/UL (ref 0–0.2)
BASOPHILS NFR BLD: 1 % (ref 0–2)
BILIRUB SERPL-MCNC: 0.5 MG/DL (ref 0.3–1.2)
BUN SERPL-MCNC: 22 MG/DL (ref 8–23)
BUN/CREAT SERPL: 38 (ref 9–20)
CALCIUM SERPL-MCNC: 9.1 MG/DL (ref 8.6–10.4)
CHLORIDE SERPL-SCNC: 108 MMOL/L (ref 98–107)
CO2 SERPL-SCNC: 24 MMOL/L (ref 20–31)
CREAT SERPL-MCNC: 0.58 MG/DL (ref 0.7–1.2)
EOSINOPHIL # BLD: 0.04 K/UL (ref 0–0.4)
EOSINOPHILS RELATIVE PERCENT: 2 % (ref 0–5)
ERYTHROCYTE [DISTWIDTH] IN BLOOD BY AUTOMATED COUNT: 13.9 % (ref 12.1–15.2)
GFR SERPL CREATININE-BSD FRML MDRD: >60 ML/MIN/1.73M2
GLUCOSE SERPL-MCNC: 97 MG/DL (ref 70–99)
HCT VFR BLD AUTO: 36.1 % (ref 41–53)
HGB BLD-MCNC: 12.6 G/DL (ref 13.5–17.5)
LYMPHOCYTES # BLD: 28 % (ref 13–44)
LYMPHOCYTES NFR BLD: 0.56 K/UL (ref 1–4.8)
MCH RBC QN AUTO: 32.8 PG (ref 26–34)
MCHC RBC AUTO-ENTMCNC: 34.8 G/DL (ref 31–37)
MCV RBC AUTO: 94.4 FL (ref 80–100)
MONOCYTES NFR BLD: 0.3 K/UL (ref 0–1)
MONOCYTES NFR BLD: 15 % (ref 5–9)
MORPHOLOGY: ABNORMAL
NEUTROPHILS NFR BLD: 54 % (ref 39–75)
NEUTS SEG NFR BLD: 1.08 K/UL (ref 2.1–6.5)
PLATELET # BLD AUTO: 175 K/UL (ref 140–450)
POTASSIUM SERPL-SCNC: 3.7 MMOL/L (ref 3.7–5.3)
PROT SERPL-MCNC: 6.7 G/DL (ref 6.4–8.3)
RBC # BLD AUTO: 3.83 M/UL (ref 4.5–5.9)
SODIUM SERPL-SCNC: 145 MMOL/L (ref 135–144)
WBC OTHER # BLD: 2 K/UL (ref 3.5–11)

## 2023-06-26 PROCEDURE — 80053 COMPREHEN METABOLIC PANEL: CPT

## 2023-06-26 PROCEDURE — 85027 COMPLETE CBC AUTOMATED: CPT

## 2023-06-26 PROCEDURE — 36415 COLL VENOUS BLD VENIPUNCTURE: CPT

## 2023-07-03 ENCOUNTER — HOSPITAL ENCOUNTER (OUTPATIENT)
Age: 68
Discharge: HOME | End: 2023-07-03
Payer: MEDICARE

## 2023-07-03 LAB
ALBUMIN SERPL-MCNC: 4.2 G/DL (ref 3.5–5.2)
ALP SERPL-CCNC: 63 U/L (ref 40–129)
ALT SERPL-CCNC: 26 U/L (ref 5–41)
ANION GAP SERPL CALCULATED.3IONS-SCNC: 9 MMOL/L (ref 9–17)
AST SERPL-CCNC: 22 U/L
BASOPHILS # BLD: ABNORMAL K/UL (ref 0–0.2)
BASOPHILS NFR BLD: ABNORMAL % (ref 0–2)
BILIRUB SERPL-MCNC: 0.6 MG/DL (ref 0.3–1.2)
BUN SERPL-MCNC: 17 MG/DL (ref 8–23)
BUN/CREAT SERPL: 27 (ref 9–20)
CALCIUM SERPL-MCNC: 9.1 MG/DL (ref 8.6–10.4)
CHLORIDE SERPL-SCNC: 105 MMOL/L (ref 98–107)
CO2 SERPL-SCNC: 24 MMOL/L (ref 20–31)
CREAT SERPL-MCNC: 0.62 MG/DL (ref 0.7–1.2)
EOSINOPHIL # BLD: ABNORMAL K/UL (ref 0–0.4)
EOSINOPHILS RELATIVE PERCENT: ABNORMAL % (ref 0–5)
ERYTHROCYTE [DISTWIDTH] IN BLOOD BY AUTOMATED COUNT: 13.6 % (ref 12.1–15.2)
GFR SERPL CREATININE-BSD FRML MDRD: >60 ML/MIN/1.73M2
GLUCOSE SERPL-MCNC: 99 MG/DL (ref 70–99)
HCT VFR BLD AUTO: 36.7 % (ref 41–53)
HGB BLD-MCNC: 12.8 G/DL (ref 13.5–17.5)
LYMPHOCYTES # BLD: 22 % (ref 13–44)
LYMPHOCYTES NFR BLD: 0.64 K/UL (ref 1–4.8)
MCH RBC QN AUTO: 33.1 PG (ref 26–34)
MCHC RBC AUTO-ENTMCNC: 34.9 G/DL (ref 31–37)
MCV RBC AUTO: 94.9 FL (ref 80–100)
MONOCYTES NFR BLD: 0.32 K/UL (ref 0–1)
MONOCYTES NFR BLD: 11 % (ref 5–9)
MORPHOLOGY: ABNORMAL
NEUTROPHILS NFR BLD: 67 % (ref 39–75)
NEUTS SEG NFR BLD: 1.94 K/UL (ref 2.1–6.5)
PLATELET # BLD AUTO: 155 K/UL (ref 140–450)
POTASSIUM SERPL-SCNC: 3.5 MMOL/L (ref 3.7–5.3)
PROT SERPL-MCNC: 6.4 G/DL (ref 6.4–8.3)
RBC # BLD AUTO: 3.87 M/UL (ref 4.5–5.9)
SODIUM SERPL-SCNC: 138 MMOL/L (ref 135–144)
WBC OTHER # BLD: 2.9 K/UL (ref 3.5–11)

## 2023-07-03 PROCEDURE — 85027 COMPLETE CBC AUTOMATED: CPT

## 2023-07-03 PROCEDURE — 36415 COLL VENOUS BLD VENIPUNCTURE: CPT

## 2023-07-03 PROCEDURE — 80053 COMPREHEN METABOLIC PANEL: CPT

## 2023-07-10 ENCOUNTER — HOSPITAL ENCOUNTER (OUTPATIENT)
Age: 68
Discharge: HOME | End: 2023-07-10
Payer: MEDICARE

## 2023-07-10 LAB
ALBUMIN SERPL-MCNC: 4.6 G/DL (ref 3.5–5.2)
ALP SERPL-CCNC: 61 U/L (ref 40–129)
ALT SERPL-CCNC: 28 U/L (ref 5–41)
ANION GAP SERPL CALCULATED.3IONS-SCNC: 8 MMOL/L (ref 9–17)
AST SERPL-CCNC: 25 U/L
BASOPHILS # BLD: 0 K/UL (ref 0–0.2)
BASOPHILS NFR BLD: 0 % (ref 0–2)
BILIRUB SERPL-MCNC: 0.7 MG/DL (ref 0.3–1.2)
BUN SERPL-MCNC: 14 MG/DL (ref 8–23)
BUN/CREAT SERPL: 20 (ref 9–20)
CALCIUM SERPL-MCNC: 9.2 MG/DL (ref 8.6–10.4)
CHLORIDE SERPL-SCNC: 105 MMOL/L (ref 98–107)
CO2 SERPL-SCNC: 27 MMOL/L (ref 20–31)
CREAT SERPL-MCNC: 0.7 MG/DL (ref 0.7–1.2)
DIFFERENTIAL TYPE: YES
EOSINOPHIL # BLD: 0.1 K/UL (ref 0–0.4)
EOSINOPHILS RELATIVE PERCENT: 2 % (ref 0–5)
ERYTHROCYTE [DISTWIDTH] IN BLOOD BY AUTOMATED COUNT: 13.4 % (ref 12.1–15.2)
GFR SERPL CREATININE-BSD FRML MDRD: >60 ML/MIN/1.73M2
GLUCOSE SERPL-MCNC: 93 MG/DL (ref 70–99)
HCT VFR BLD AUTO: 37.2 % (ref 41–53)
HGB BLD-MCNC: 12.7 G/DL (ref 13.5–17.5)
LYMPHOCYTES # BLD: 16 % (ref 13–44)
LYMPHOCYTES NFR BLD: 0.6 K/UL (ref 1–4.8)
MCH RBC QN AUTO: 32.3 PG (ref 26–34)
MCHC RBC AUTO-ENTMCNC: 34.3 G/DL (ref 31–37)
MCV RBC AUTO: 94.3 FL (ref 80–100)
MONOCYTES NFR BLD: 0.4 K/UL (ref 0–1)
MONOCYTES NFR BLD: 10 % (ref 5–9)
NEUTROPHILS NFR BLD: 72 % (ref 39–75)
NEUTS SEG NFR BLD: 2.7 K/UL (ref 2.1–6.5)
PLATELET # BLD AUTO: 140 K/UL (ref 140–450)
POTASSIUM SERPL-SCNC: 3.9 MMOL/L (ref 3.7–5.3)
PROT SERPL-MCNC: 6.7 G/DL (ref 6.4–8.3)
RBC # BLD AUTO: 3.94 M/UL (ref 4.5–5.9)
SODIUM SERPL-SCNC: 140 MMOL/L (ref 135–144)
WBC OTHER # BLD: 3.8 K/UL (ref 3.5–11)

## 2023-07-10 PROCEDURE — 36415 COLL VENOUS BLD VENIPUNCTURE: CPT

## 2023-07-10 PROCEDURE — 80053 COMPREHEN METABOLIC PANEL: CPT

## 2023-07-10 PROCEDURE — 85027 COMPLETE CBC AUTOMATED: CPT

## 2023-07-17 ENCOUNTER — HOSPITAL ENCOUNTER (OUTPATIENT)
Age: 68
Discharge: HOME | End: 2023-07-17
Payer: MEDICARE

## 2023-07-17 LAB
ALBUMIN SERPL-MCNC: 4.5 G/DL (ref 3.5–5.2)
ALP SERPL-CCNC: 63 U/L (ref 40–129)
ALT SERPL-CCNC: 31 U/L (ref 5–41)
ANION GAP SERPL CALCULATED.3IONS-SCNC: 12 MMOL/L (ref 9–17)
AST SERPL-CCNC: 28 U/L
BASOPHILS # BLD: 0 K/UL (ref 0–0.2)
BASOPHILS NFR BLD: 0 % (ref 0–2)
BILIRUB SERPL-MCNC: 0.8 MG/DL (ref 0.3–1.2)
BUN SERPL-MCNC: 16 MG/DL (ref 8–23)
BUN/CREAT SERPL: 27 (ref 9–20)
CALCIUM SERPL-MCNC: 9.5 MG/DL (ref 8.6–10.4)
CHLORIDE SERPL-SCNC: 106 MMOL/L (ref 98–107)
CO2 SERPL-SCNC: 23 MMOL/L (ref 20–31)
CREAT SERPL-MCNC: 0.6 MG/DL (ref 0.7–1.2)
DIFFERENTIAL TYPE: YES
EOSINOPHIL # BLD: 0.2 K/UL (ref 0–0.4)
EOSINOPHILS RELATIVE PERCENT: 4 % (ref 0–5)
ERYTHROCYTE [DISTWIDTH] IN BLOOD BY AUTOMATED COUNT: 13.3 % (ref 12.1–15.2)
GFR SERPL CREATININE-BSD FRML MDRD: >60 ML/MIN/1.73M2
GLUCOSE SERPL-MCNC: 92 MG/DL (ref 70–99)
HCT VFR BLD AUTO: 38.7 % (ref 41–53)
HGB BLD-MCNC: 13.2 G/DL (ref 13.5–17.5)
LYMPHOCYTES # BLD: 15 % (ref 13–44)
LYMPHOCYTES NFR BLD: 0.7 K/UL (ref 1–4.8)
MCH RBC QN AUTO: 32.3 PG (ref 26–34)
MCHC RBC AUTO-ENTMCNC: 34 G/DL (ref 31–37)
MCV RBC AUTO: 94.9 FL (ref 80–100)
MONOCYTES NFR BLD: 0.4 K/UL (ref 0–1)
MONOCYTES NFR BLD: 9 % (ref 5–9)
NEUTROPHILS NFR BLD: 72 % (ref 39–75)
NEUTS SEG NFR BLD: 3.2 K/UL (ref 2.1–6.5)
PLATELET # BLD AUTO: 147 K/UL (ref 140–450)
POTASSIUM SERPL-SCNC: 4.1 MMOL/L (ref 3.7–5.3)
PROT SERPL-MCNC: 6.7 G/DL (ref 6.4–8.3)
RBC # BLD AUTO: 4.08 M/UL (ref 4.5–5.9)
SODIUM SERPL-SCNC: 141 MMOL/L (ref 135–144)
WBC OTHER # BLD: 4.4 K/UL (ref 3.5–11)

## 2023-07-17 PROCEDURE — 80053 COMPREHEN METABOLIC PANEL: CPT

## 2023-07-17 PROCEDURE — 85027 COMPLETE CBC AUTOMATED: CPT

## 2023-07-17 PROCEDURE — 36415 COLL VENOUS BLD VENIPUNCTURE: CPT

## 2023-07-24 ENCOUNTER — HOSPITAL ENCOUNTER (OUTPATIENT)
Age: 68
Discharge: HOME | End: 2023-07-24
Payer: MEDICARE

## 2023-07-24 LAB
ALBUMIN SERPL-MCNC: 4.6 G/DL (ref 3.5–5.2)
ALP SERPL-CCNC: 65 U/L (ref 40–129)
ALT SERPL-CCNC: 27 U/L (ref 5–41)
ANION GAP SERPL CALCULATED.3IONS-SCNC: 10 MMOL/L (ref 9–17)
AST SERPL-CCNC: 23 U/L
BASOPHILS # BLD: 0 K/UL (ref 0–0.2)
BASOPHILS NFR BLD: 0 % (ref 0–2)
BILIRUB SERPL-MCNC: 0.8 MG/DL (ref 0.3–1.2)
BUN SERPL-MCNC: 18 MG/DL (ref 8–23)
BUN/CREAT SERPL: 26 (ref 9–20)
CALCIUM SERPL-MCNC: 9.6 MG/DL (ref 8.6–10.4)
CHLORIDE SERPL-SCNC: 106 MMOL/L (ref 98–107)
CO2 SERPL-SCNC: 25 MMOL/L (ref 20–31)
CREAT SERPL-MCNC: 0.7 MG/DL (ref 0.7–1.2)
DIFFERENTIAL TYPE: YES
EOSINOPHIL # BLD: 0.2 K/UL (ref 0–0.4)
EOSINOPHILS RELATIVE PERCENT: 4 % (ref 0–5)
ERYTHROCYTE [DISTWIDTH] IN BLOOD BY AUTOMATED COUNT: 13 % (ref 12.1–15.2)
GFR SERPL CREATININE-BSD FRML MDRD: >60 ML/MIN/1.73M2
GLUCOSE SERPL-MCNC: 109 MG/DL (ref 70–99)
HCT VFR BLD AUTO: 38.8 % (ref 41–53)
HGB BLD-MCNC: 13.6 G/DL (ref 13.5–17.5)
LYMPHOCYTES NFR BLD: 0.7 K/UL (ref 1–4.8)
LYMPHOCYTES RELATIVE PERCENT: 16 % (ref 13–44)
MCH RBC QN AUTO: 32.5 PG (ref 26–34)
MCHC RBC AUTO-ENTMCNC: 35.2 G/DL (ref 31–37)
MCV RBC AUTO: 92.5 FL (ref 80–100)
MONOCYTES NFR BLD: 0.3 K/UL (ref 0–1)
MONOCYTES NFR BLD: 8 % (ref 5–9)
NEUTROPHILS NFR BLD: 72 % (ref 39–75)
NEUTS SEG NFR BLD: 2.9 K/UL (ref 2.1–6.5)
PLATELET # BLD AUTO: 143 K/UL (ref 140–450)
POTASSIUM SERPL-SCNC: 3.9 MMOL/L (ref 3.7–5.3)
PROT SERPL-MCNC: 6.8 G/DL (ref 6.4–8.3)
RBC # BLD AUTO: 4.19 M/UL (ref 4.5–5.9)
SODIUM SERPL-SCNC: 141 MMOL/L (ref 135–144)
WBC OTHER # BLD: 4.1 K/UL (ref 3.5–11)

## 2023-07-24 PROCEDURE — 80053 COMPREHEN METABOLIC PANEL: CPT

## 2023-07-24 PROCEDURE — 36415 COLL VENOUS BLD VENIPUNCTURE: CPT

## 2023-07-24 PROCEDURE — 85027 COMPLETE CBC AUTOMATED: CPT

## 2023-07-28 ENCOUNTER — HOSPITAL ENCOUNTER (OUTPATIENT)
Age: 68
Discharge: HOME | End: 2023-07-28
Payer: MEDICARE

## 2023-07-28 LAB — PSA SERPL-MCNC: 4.46 NG/ML

## 2023-07-28 PROCEDURE — 36415 COLL VENOUS BLD VENIPUNCTURE: CPT

## 2023-07-28 PROCEDURE — 84153 ASSAY OF PSA TOTAL: CPT

## 2023-07-31 ENCOUNTER — HOSPITAL ENCOUNTER (OUTPATIENT)
Age: 68
Discharge: HOME | End: 2023-07-31
Payer: MEDICARE

## 2023-07-31 LAB
ALBUMIN SERPL-MCNC: 4.5 G/DL (ref 3.5–5.2)
ALP SERPL-CCNC: 69 U/L (ref 40–129)
ALT SERPL-CCNC: 34 U/L (ref 5–41)
ANION GAP SERPL CALCULATED.3IONS-SCNC: 11 MMOL/L (ref 9–17)
AST SERPL-CCNC: 29 U/L
BASOPHILS # BLD: 0 K/UL (ref 0–0.2)
BASOPHILS NFR BLD: 0 % (ref 0–2)
BILIRUB SERPL-MCNC: 0.6 MG/DL (ref 0.3–1.2)
BUN SERPL-MCNC: 13 MG/DL (ref 8–23)
BUN/CREAT SERPL: 16 (ref 9–20)
CALCIUM SERPL-MCNC: 9.4 MG/DL (ref 8.6–10.4)
CHLORIDE SERPL-SCNC: 105 MMOL/L (ref 98–107)
CO2 SERPL-SCNC: 26 MMOL/L (ref 20–31)
CREAT SERPL-MCNC: 0.8 MG/DL (ref 0.7–1.2)
DIFFERENTIAL TYPE: YES
EOSINOPHIL # BLD: 0.1 K/UL (ref 0–0.4)
EOSINOPHILS RELATIVE PERCENT: 2 % (ref 0–5)
ERYTHROCYTE [DISTWIDTH] IN BLOOD BY AUTOMATED COUNT: 13.6 % (ref 12.1–15.2)
GFR SERPL CREATININE-BSD FRML MDRD: >60 ML/MIN/1.73M2
GLUCOSE SERPL-MCNC: 128 MG/DL (ref 70–99)
HCT VFR BLD AUTO: 38 % (ref 41–53)
HGB BLD-MCNC: 13.1 G/DL (ref 13.5–17.5)
LYMPHOCYTES NFR BLD: 0.4 K/UL (ref 1–4.8)
LYMPHOCYTES RELATIVE PERCENT: 11 % (ref 13–44)
MCH RBC QN AUTO: 31.9 PG (ref 26–34)
MCHC RBC AUTO-ENTMCNC: 34.4 G/DL (ref 31–37)
MCV RBC AUTO: 92.7 FL (ref 80–100)
MONOCYTES NFR BLD: 0.3 K/UL (ref 0–1)
MONOCYTES NFR BLD: 8 % (ref 5–9)
NEUTROPHILS NFR BLD: 79 % (ref 39–75)
NEUTS SEG NFR BLD: 3.1 K/UL (ref 2.1–6.5)
PLATELET # BLD AUTO: 141 K/UL (ref 140–450)
POTASSIUM SERPL-SCNC: 3.9 MMOL/L (ref 3.7–5.3)
PROT SERPL-MCNC: 7 G/DL (ref 6.4–8.3)
RBC # BLD AUTO: 4.1 M/UL (ref 4.5–5.9)
SODIUM SERPL-SCNC: 142 MMOL/L (ref 135–144)
WBC OTHER # BLD: 3.9 K/UL (ref 3.5–11)

## 2023-07-31 PROCEDURE — 36415 COLL VENOUS BLD VENIPUNCTURE: CPT

## 2023-07-31 PROCEDURE — 80053 COMPREHEN METABOLIC PANEL: CPT

## 2023-07-31 PROCEDURE — 85025 COMPLETE CBC W/AUTO DIFF WBC: CPT

## 2023-08-07 ENCOUNTER — HOSPITAL ENCOUNTER (OUTPATIENT)
Age: 68
Discharge: HOME | End: 2023-08-07
Payer: MEDICARE

## 2023-08-07 LAB
ALBUMIN SERPL-MCNC: 4.2 G/DL (ref 3.5–5.2)
ALP SERPL-CCNC: 65 U/L (ref 40–129)
ALT SERPL-CCNC: 28 U/L (ref 5–41)
ANION GAP SERPL CALCULATED.3IONS-SCNC: 10 MMOL/L (ref 9–17)
AST SERPL-CCNC: 24 U/L
BASOPHILS # BLD: 0 K/UL (ref 0–0.2)
BASOPHILS NFR BLD: 0 % (ref 0–2)
BILIRUB SERPL-MCNC: 0.5 MG/DL (ref 0.3–1.2)
BUN SERPL-MCNC: 17 MG/DL (ref 8–23)
BUN/CREAT SERPL: 24 (ref 9–20)
CALCIUM SERPL-MCNC: 9 MG/DL (ref 8.6–10.4)
CHLORIDE SERPL-SCNC: 105 MMOL/L (ref 98–107)
CO2 SERPL-SCNC: 25 MMOL/L (ref 20–31)
CREAT SERPL-MCNC: 0.7 MG/DL (ref 0.7–1.2)
EOSINOPHIL # BLD: 0.1 K/UL (ref 0–0.4)
EOSINOPHILS RELATIVE PERCENT: 3 % (ref 0–5)
ERYTHROCYTE [DISTWIDTH] IN BLOOD BY AUTOMATED COUNT: 13.9 % (ref 12.1–15.2)
GFR SERPL CREATININE-BSD FRML MDRD: >60 ML/MIN/1.73M2
GLUCOSE SERPL-MCNC: 97 MG/DL (ref 70–99)
HCT VFR BLD AUTO: 36.4 % (ref 41–53)
HGB BLD-MCNC: 12.5 G/DL (ref 13.5–17.5)
LYMPHOCYTES NFR BLD: 0.4 K/UL (ref 1–4.8)
LYMPHOCYTES RELATIVE PERCENT: 11 % (ref 13–44)
MCH RBC QN AUTO: 32.4 PG (ref 26–34)
MCHC RBC AUTO-ENTMCNC: 34.5 G/DL (ref 31–37)
MCV RBC AUTO: 94.1 FL (ref 80–100)
MONOCYTES NFR BLD: 0.3 K/UL (ref 0–1)
MONOCYTES NFR BLD: 9 % (ref 5–9)
MORPHOLOGY: ABNORMAL
NEUTROPHILS NFR BLD: 77 % (ref 39–75)
NEUTS SEG NFR BLD: 2.6 K/UL (ref 2.1–6.5)
PLATELET # BLD AUTO: 98 K/UL (ref 140–450)
POTASSIUM SERPL-SCNC: 3.6 MMOL/L (ref 3.7–5.3)
PROT SERPL-MCNC: 6.5 G/DL (ref 6.4–8.3)
RBC # BLD AUTO: 3.87 M/UL (ref 4.5–5.9)
SODIUM SERPL-SCNC: 140 MMOL/L (ref 135–144)
WBC OTHER # BLD: 3.4 K/UL (ref 3.5–11)

## 2023-08-07 PROCEDURE — 85025 COMPLETE CBC W/AUTO DIFF WBC: CPT

## 2023-08-07 PROCEDURE — 36415 COLL VENOUS BLD VENIPUNCTURE: CPT

## 2023-08-07 PROCEDURE — 80053 COMPREHEN METABOLIC PANEL: CPT

## 2023-08-14 ENCOUNTER — HOSPITAL ENCOUNTER (OUTPATIENT)
Age: 68
Discharge: HOME | End: 2023-08-14
Payer: MEDICARE

## 2023-08-14 LAB
ALBUMIN SERPL-MCNC: 4.5 G/DL (ref 3.5–5.2)
ALP SERPL-CCNC: 62 U/L (ref 40–129)
ALT SERPL-CCNC: 27 U/L (ref 5–41)
ANION GAP SERPL CALCULATED.3IONS-SCNC: 11 MMOL/L (ref 9–17)
AST SERPL-CCNC: 27 U/L
BASOPHILS # BLD: ABNORMAL K/UL (ref 0–0.2)
BASOPHILS NFR BLD: ABNORMAL % (ref 0–2)
BILIRUB SERPL-MCNC: 0.7 MG/DL (ref 0.3–1.2)
BUN SERPL-MCNC: 16 MG/DL (ref 8–23)
BUN/CREAT SERPL: 23 (ref 9–20)
CALCIUM SERPL-MCNC: 9.2 MG/DL (ref 8.6–10.4)
CHLORIDE SERPL-SCNC: 104 MMOL/L (ref 98–107)
CO2 SERPL-SCNC: 24 MMOL/L (ref 20–31)
CREAT SERPL-MCNC: 0.7 MG/DL (ref 0.7–1.2)
EOSINOPHIL # BLD: 0.04 K/UL (ref 0–0.4)
EOSINOPHILS RELATIVE PERCENT: 1 % (ref 0–5)
ERYTHROCYTE [DISTWIDTH] IN BLOOD BY AUTOMATED COUNT: 13.8 % (ref 12.1–15.2)
GFR SERPL CREATININE-BSD FRML MDRD: >60 ML/MIN/1.73M2
GLUCOSE SERPL-MCNC: 108 MG/DL (ref 70–99)
HCT VFR BLD AUTO: 36 % (ref 41–53)
HGB BLD-MCNC: 12.4 G/DL (ref 13.5–17.5)
LYMPHOCYTES NFR BLD: 0.67 K/UL (ref 1–4.8)
LYMPHOCYTES RELATIVE PERCENT: 16 % (ref 13–44)
MCH RBC QN AUTO: 32.2 PG (ref 26–34)
MCHC RBC AUTO-ENTMCNC: 34.5 G/DL (ref 31–37)
MCV RBC AUTO: 93.4 FL (ref 80–100)
MONOCYTES NFR BLD: 0.34 K/UL (ref 0–1)
MONOCYTES NFR BLD: 8 % (ref 5–9)
MORPHOLOGY: ABNORMAL
MORPHOLOGY: ABNORMAL
NEUTROPHILS NFR BLD: 75 % (ref 39–75)
NEUTS SEG NFR BLD: 3.15 K/UL (ref 2.1–6.5)
PLATELET # BLD AUTO: 91 K/UL (ref 140–450)
POTASSIUM SERPL-SCNC: 3.5 MMOL/L (ref 3.7–5.3)
PROT SERPL-MCNC: 6.8 G/DL (ref 6.4–8.3)
RBC # BLD AUTO: 3.86 M/UL (ref 4.5–5.9)
SODIUM SERPL-SCNC: 139 MMOL/L (ref 135–144)
WBC OTHER # BLD: 4.2 K/UL (ref 3.5–11)

## 2023-08-14 PROCEDURE — 36415 COLL VENOUS BLD VENIPUNCTURE: CPT

## 2023-08-14 PROCEDURE — 80053 COMPREHEN METABOLIC PANEL: CPT

## 2023-08-14 PROCEDURE — 85025 COMPLETE CBC W/AUTO DIFF WBC: CPT

## 2023-09-05 ENCOUNTER — HOSPITAL ENCOUNTER (OUTPATIENT)
Age: 68
Discharge: HOME | End: 2023-09-05
Payer: MEDICARE

## 2023-09-05 LAB
ALBUMIN SERPL-MCNC: 4.3 G/DL (ref 3.5–5.2)
ALP SERPL-CCNC: 74 U/L (ref 40–129)
ALT SERPL-CCNC: 23 U/L (ref 5–41)
ANION GAP SERPL CALCULATED.3IONS-SCNC: 9 MMOL/L (ref 9–17)
AST SERPL-CCNC: 29 U/L
BASOPHILS # BLD: ABNORMAL K/UL (ref 0–0.2)
BASOPHILS NFR BLD: ABNORMAL % (ref 0–2)
BILIRUB SERPL-MCNC: 0.7 MG/DL (ref 0.3–1.2)
BUN SERPL-MCNC: 18 MG/DL (ref 8–23)
BUN/CREAT SERPL: 26 (ref 9–20)
CALCIUM SERPL-MCNC: 9.4 MG/DL (ref 8.6–10.4)
CHLORIDE SERPL-SCNC: 106 MMOL/L (ref 98–107)
CO2 SERPL-SCNC: 26 MMOL/L (ref 20–31)
CREAT SERPL-MCNC: 0.7 MG/DL (ref 0.7–1.2)
EOSINOPHIL # BLD: 0.07 K/UL (ref 0–0.4)
EOSINOPHILS RELATIVE PERCENT: 3 % (ref 0–5)
ERYTHROCYTE [DISTWIDTH] IN BLOOD BY AUTOMATED COUNT: 14.5 % (ref 12.1–15.2)
GFR SERPL CREATININE-BSD FRML MDRD: >60 ML/MIN/1.73M2
GLUCOSE SERPL-MCNC: 139 MG/DL (ref 70–99)
HCT VFR BLD AUTO: 33.8 % (ref 41–53)
HGB BLD-MCNC: 11.6 G/DL (ref 13.5–17.5)
IMM GRANULOCYTES # BLD AUTO: ABNORMAL K/UL (ref 0–0.3)
IMM GRANULOCYTES NFR BLD: ABNORMAL %
LYMPHOCYTES NFR BLD: 0.64 K/UL (ref 1–4.8)
LYMPHOCYTES RELATIVE PERCENT: 28 % (ref 13–44)
MCH RBC QN AUTO: 31.8 PG (ref 26–34)
MCHC RBC AUTO-ENTMCNC: 34.5 G/DL (ref 31–37)
MCV RBC AUTO: 92.2 FL (ref 80–100)
MONOCYTES NFR BLD: 0.23 K/UL (ref 0–1)
MONOCYTES NFR BLD: 10 % (ref 5–9)
MORPHOLOGY: ABNORMAL
MORPHOLOGY: ABNORMAL
NEUTROPHILS NFR BLD: 59 % (ref 39–75)
NEUTS SEG NFR BLD: 1.36 K/UL (ref 2.1–6.5)
PLATELET # BLD AUTO: 120 K/UL (ref 140–450)
POTASSIUM SERPL-SCNC: 3.6 MMOL/L (ref 3.7–5.3)
PROT SERPL-MCNC: 6.5 G/DL (ref 6.4–8.3)
RBC # BLD AUTO: 3.66 M/UL (ref 4.5–5.9)
SODIUM SERPL-SCNC: 141 MMOL/L (ref 135–144)
WBC OTHER # BLD: 2.3 K/UL (ref 3.5–11)

## 2023-09-05 PROCEDURE — 36415 COLL VENOUS BLD VENIPUNCTURE: CPT

## 2023-09-05 PROCEDURE — 80053 COMPREHEN METABOLIC PANEL: CPT

## 2023-09-05 PROCEDURE — 85025 COMPLETE CBC W/AUTO DIFF WBC: CPT

## 2023-09-18 ENCOUNTER — HOSPITAL ENCOUNTER (OUTPATIENT)
Age: 68
Discharge: HOME | End: 2023-09-18
Payer: MEDICARE

## 2023-09-18 LAB
ALBUMIN SERPL-MCNC: 4.7 G/DL (ref 3.5–5.2)
ALP SERPL-CCNC: 74 U/L (ref 40–129)
ALT SERPL-CCNC: 28 U/L (ref 5–41)
ANION GAP SERPL CALCULATED.3IONS-SCNC: 9 MMOL/L (ref 9–17)
AST SERPL-CCNC: 40 U/L
BASOPHILS # BLD: ABNORMAL K/UL (ref 0–0.2)
BASOPHILS NFR BLD: ABNORMAL % (ref 0–2)
BILIRUB SERPL-MCNC: 0.7 MG/DL (ref 0.3–1.2)
BUN SERPL-MCNC: 17 MG/DL (ref 8–23)
BUN/CREAT SERPL: 24 (ref 9–20)
CALCIUM SERPL-MCNC: 9.4 MG/DL (ref 8.6–10.4)
CHLORIDE SERPL-SCNC: 101 MMOL/L (ref 98–107)
CO2 SERPL-SCNC: 27 MMOL/L (ref 20–31)
CREAT SERPL-MCNC: 0.7 MG/DL (ref 0.7–1.2)
EOSINOPHIL # BLD: ABNORMAL K/UL (ref 0–0.4)
EOSINOPHILS RELATIVE PERCENT: ABNORMAL % (ref 0–5)
ERYTHROCYTE [DISTWIDTH] IN BLOOD BY AUTOMATED COUNT: 14.6 % (ref 12.1–15.2)
GFR SERPL CREATININE-BSD FRML MDRD: >60 ML/MIN/1.73M2
GLUCOSE SERPL-MCNC: 106 MG/DL (ref 70–99)
HCT VFR BLD AUTO: 37 % (ref 41–53)
HGB BLD-MCNC: 12.6 G/DL (ref 13.5–17.5)
IMM GRANULOCYTES # BLD AUTO: ABNORMAL K/UL (ref 0–0.3)
IMM GRANULOCYTES NFR BLD: ABNORMAL %
LYMPHOCYTES NFR BLD: 0.64 K/UL (ref 1–4.8)
LYMPHOCYTES RELATIVE PERCENT: 28 % (ref 13–44)
MCH RBC QN AUTO: 31.8 PG (ref 26–34)
MCHC RBC AUTO-ENTMCNC: 34 G/DL (ref 31–37)
MCV RBC AUTO: 93.4 FL (ref 80–100)
MONOCYTES NFR BLD: 0.32 K/UL (ref 0–1)
MONOCYTES NFR BLD: 14 % (ref 5–9)
MORPHOLOGY: ABNORMAL
NEUTROPHILS NFR BLD: 58 % (ref 39–75)
NEUTS SEG NFR BLD: 1.34 K/UL (ref 2.1–6.5)
PLATELET # BLD AUTO: 154 K/UL (ref 140–450)
POTASSIUM SERPL-SCNC: 3.5 MMOL/L (ref 3.7–5.3)
PROT SERPL-MCNC: 7.1 G/DL (ref 6.4–8.3)
RBC # BLD AUTO: 3.96 M/UL (ref 4.5–5.9)
SODIUM SERPL-SCNC: 137 MMOL/L (ref 135–144)
WBC OTHER # BLD: 2.3 K/UL (ref 3.5–11)

## 2023-09-18 PROCEDURE — 80053 COMPREHEN METABOLIC PANEL: CPT

## 2023-09-18 PROCEDURE — 36415 COLL VENOUS BLD VENIPUNCTURE: CPT

## 2023-09-18 PROCEDURE — 85025 COMPLETE CBC W/AUTO DIFF WBC: CPT

## 2023-09-28 ENCOUNTER — HOSPITAL ENCOUNTER (OUTPATIENT)
Age: 68
Discharge: HOME | End: 2023-09-28
Payer: MEDICARE

## 2023-09-28 PROCEDURE — 36415 COLL VENOUS BLD VENIPUNCTURE: CPT

## 2023-09-28 PROCEDURE — 84154 ASSAY OF PSA FREE: CPT

## 2023-09-29 LAB
PSA FREE MFR SERPL: 26.9 %
PSA FREE SERPL-MCNC: 0.7 UG/L
PSA SERPL-MCNC: 2.6 UG/L (ref 0–4)